# Patient Record
Sex: FEMALE | Race: WHITE | NOT HISPANIC OR LATINO | Employment: OTHER | URBAN - METROPOLITAN AREA
[De-identification: names, ages, dates, MRNs, and addresses within clinical notes are randomized per-mention and may not be internally consistent; named-entity substitution may affect disease eponyms.]

---

## 2017-04-03 RX ORDER — LEVOTHYROXINE SODIUM 0.07 MG/1
TABLET ORAL
Qty: 90 TAB | Refills: 1 | Status: SHIPPED | OUTPATIENT
Start: 2017-04-03 | End: 2017-09-04 | Stop reason: SDUPTHER

## 2017-04-03 NOTE — TELEPHONE ENCOUNTER
Was the patient seen in the last year in this department? Yes     Does patient have an active prescription for medications requested? No     Received Request Via: Pharmacy     Last seen: 07/18/2016 Tadeo

## 2017-04-05 ENCOUNTER — OFFICE VISIT (OUTPATIENT)
Dept: CARDIOLOGY | Facility: MEDICAL CENTER | Age: 57
End: 2017-04-05
Payer: COMMERCIAL

## 2017-04-05 VITALS
BODY MASS INDEX: 21.97 KG/M2 | HEIGHT: 67 IN | DIASTOLIC BLOOD PRESSURE: 70 MMHG | WEIGHT: 140 LBS | HEART RATE: 70 BPM | SYSTOLIC BLOOD PRESSURE: 116 MMHG | OXYGEN SATURATION: 93 %

## 2017-04-05 DIAGNOSIS — I95.1 ORTHOSTATIC HYPOTENSION: ICD-10-CM

## 2017-04-05 DIAGNOSIS — R55 SYNCOPE AND COLLAPSE: ICD-10-CM

## 2017-04-05 DIAGNOSIS — R55 VASOVAGAL SYNCOPE: ICD-10-CM

## 2017-04-05 LAB — EKG IMPRESSION: NORMAL

## 2017-04-05 PROCEDURE — 99213 OFFICE O/P EST LOW 20 MIN: CPT | Performed by: INTERNAL MEDICINE

## 2017-04-05 PROCEDURE — 93000 ELECTROCARDIOGRAM COMPLETE: CPT | Performed by: INTERNAL MEDICINE

## 2017-04-05 NOTE — PROGRESS NOTES
Subjective:   Kalie Cook is a 56 y.o. female who presents today with neurocardiogenic syncope with good response to salt, hydration and midodrine. No further episodes.    Past Medical History   Diagnosis Date   • Asthma      Past Surgical History   Procedure Laterality Date   • Sinusotomies     • Primary c section     • Abdominal exploration       History reviewed. No pertinent family history.  History   Smoking status   • Never Smoker    Smokeless tobacco   • Never Used     Allergies   Allergen Reactions   • Sulfa Drugs Hives     Outpatient Encounter Prescriptions as of 4/5/2017   Medication Sig Dispense Refill   • levothyroxine (SYNTHROID) 75 MCG Tab TAKE 1 TABLET EVERY MORNING ON AN EMPTY STOMACH 90 Tab 1   • escitalopram (LEXAPRO) 20 MG tablet Take 1 Tab by mouth every day. 90 Tab 3   • midodrine (PROAMATINE) 10 MG tablet Take 1 Tab by mouth 3 times a day, with meals. 270 Tab 3   • Nutritional Supplements (ESTROVEN PO) Take  by mouth.     • Mometasone Furo-Formoterol Fum 100-5 MCG/ACT Aerosol Inhale  by mouth.     • budesonide (PULMICORT) 0.5 MG/2ML Suspension 500 mcg every day. Patient uses nebulizer solution in a mary jane pot like applicator with saline solution and performs a nasal irrigation twice a day     • montelukast (SINGULAIR) 10 MG Tab Take 10 mg by mouth every day.     • aspirin (ASA) 325 MG Tab Take 325 mg by mouth 2 Times a Day.     • Multiple Vitamin (MULTI VITAMIN DAILY PO) Take 1 tablet by mouth every day.     • Probiotic Product (PROBIOTIC DAILY PO) Take 1 Tab by mouth every day.     • TURMERIC PO Take 1 Tab by mouth every day.     • COD LIVER OIL PO Take 2 Caps by mouth every day.     • Multiple Vitamins-Minerals (AIRBORNE) Chew Tab Take 2 Tabs by mouth 2 Times a Day.     • escitalopram (LEXAPRO) 20 MG tablet TAKE 1 TABLET EVERY DAY (Patient not taking: Reported on 4/5/2017) 90 Tab 1   • Oral Electrolytes (BUFFERED SALT) 482 MG Tab Take 1 Tab by mouth 2 Times a Day.       No  "facility-administered encounter medications on file as of 4/5/2017.     ROS     Objective:   /70 mmHg  Pulse 70  Ht 1.702 m (5' 7.01\")  Wt 63.504 kg (140 lb)  BMI 21.92 kg/m2  SpO2 93%    Physical Exam   Constitutional: She is oriented to person, place, and time. She appears well-developed and well-nourished. No distress.   HENT:   Mouth/Throat: Oropharynx is clear and moist.   Eyes: Conjunctivae and EOM are normal.   Neck: Neck supple. No JVD present. No thyroid mass present.   Cardiovascular: Normal rate, regular rhythm, S1 normal, S2 normal and normal pulses.  PMI is not displaced.  Exam reveals no gallop.    No murmur heard.  Pulses:       Carotid pulses are 2+ on the right side, and 2+ on the left side.       Radial pulses are 2+ on the right side, and 2+ on the left side.        Femoral pulses are 2+ on the right side, and 2+ on the left side.       Dorsalis pedis pulses are 2+ on the right side, and 2+ on the left side.   No peripheral edema.   Pulmonary/Chest: Effort normal and breath sounds normal.   Abdominal: Soft. Normal appearance. She exhibits no abdominal bruit and no mass. There is no hepatosplenomegaly. There is no tenderness.   Musculoskeletal: Normal range of motion. She exhibits no edema.        Lumbar back: She exhibits no tenderness and no spasm.   Neurological: She is alert and oriented to person, place, and time. She has normal strength.   Skin: Skin is warm and dry. No rash noted. No cyanosis. Nails show no clubbing.   Psychiatric: She has a normal mood and affect.       Assessment:     1. Syncope and collapse  EKG   2. Vasovagal syncope     3. Orthostatic hypotension         Medical Decision Making:  Today's Assessment / Status / Plan:     1. Syncope continue current regimen. She may try cutting midodrine back this summer.  2. F/U in 6 months.  "

## 2017-04-05 NOTE — MR AVS SNAPSHOT
"        Kalie Cook   2017 4:20 PM   Office Visit   MRN: [id number]    Department:  [facility]   Dept Phone:  [phone]    Description:  Female : 1960   Provider:  Joselito Peres M.D.           Reason for Visit     Follow-Up           Allergies as of 2017     Allergen Noted Reactions    Sulfa Drugs 2016   Hives      You were diagnosed with     Syncope and collapse   [780.2.ICD-9-CM]       Vasovagal syncope   [2015]       Orthostatic hypotension   [458.0.ICD-9-CM]         Vital Signs     Blood Pressure Pulse Height Weight Body Mass Index Oxygen Saturation    116/70 mmHg 70 1.702 m (5' 7.01\") 63.504 kg (140 lb) 21.92 kg/m2 93%    Smoking Status                   Never Smoker            Basic Information     Date Of Birth Sex Race Ethnicity Preferred Language    1960 Female White Non- English      Your appointments     Oct 04, 2017  4:20 PM   FOLLOW UP with Joselito Peres M.D.   [facility] (--)    [address]  [facility] [address]   [phone]              Problem List              ICD-10-CM Priority Class Noted - Resolved    Syncope and collapse R55 High  3/31/2016 - Present    Sinusitis J32.9   3/31/2016 - Present    Hypothyroidism (acquired) E03.9   2016 - Present    Depression F32.9   2016 - Present    Vasovagal syncope R55   2016 - Present    Orthostatic hypotension I95.1   2016 - Present    Pap smear of cervix shows high risk HPV present R87.810   2016 - Present      Health Maintenance        Date Due Completion Dates    IMM DTaP/Tdap/Td Vaccine (1 - Tdap) 12/15/1978 ---    COLONOSCOPY 12/15/2009 ---    MAMMOGRAM 2017, 7/15/2016, 7/15/2016, 2015 (Done)    Override on 2015: Done    PAP SMEAR 2019, 2015 (Done)    Override on 2015: Done            Results       Current Immunizations     No immunizations on file.      Below and/or attached are " the medications your provider expects you to take. Review all of your home medications and newly ordered medications with your provider and/or pharmacist. Follow medication instructions as directed by your provider and/or pharmacist. Please keep your medication list with you and share with your provider. Update the information when medications are discontinued, doses are changed, or new medications (including over-the-counter products) are added; and carry medication information at all times in the event of emergency situations     Allergies:  SULFA DRUGS - Hives               Medications  Valid as of: April 05, 2017 -  4:53 PM    Generic Name Brand Name Tablet Size Instructions for use    Aspirin (Tab)  MG Take 325 mg by mouth 2 Times a Day.        Budesonide (Suspension) PULMICORT 0.5 MG/2ML 500 mcg every day. Patient uses nebulizer solution in a mary jane pot like applicator with saline solution and performs a nasal irrigation twice a day        Cod Liver Oil   Take 2 Caps by mouth every day.        Escitalopram Oxalate (Tab) LEXAPRO 20 MG Take 1 Tab by mouth every day.        Escitalopram Oxalate (Tab) LEXAPRO 20 MG TAKE 1 TABLET EVERY DAY        Levothyroxine Sodium (Tab) SYNTHROID 75 MCG TAKE 1 TABLET EVERY MORNING ON AN EMPTY STOMACH        Midodrine HCl (Tab) PROAMATINE 10 MG Take 1 Tab by mouth 3 times a day, with meals.        Mometasone Furo-Formoterol Fum (Aerosol) Mometasone Furo-Formoterol Fum 100-5 MCG/ACT Inhale  by mouth.        Montelukast Sodium (Tab) SINGULAIR 10 MG Take 10 mg by mouth every day.        Multiple Vitamin   Take 1 tablet by mouth every day.        Multiple Vitamins-Minerals (Chew Tab) AIRBORNE  Take 2 Tabs by mouth 2 Times a Day.        Nutritional Supplements   Take  by mouth.        Oral Electrolytes (Tab) buffered salt 482 MG Take 1 Tab by mouth 2 Times a Day.        Probiotic Product   Take 1 Tab by mouth every day.        Turmeric   Take 1 Tab by mouth every day.        .                  Medicines prescribed today were sent to:     [facility] [place], [place] - [address] [facility]    [address] [facility] [address]    Phone: [phone] Fax: [phone]    Open 24 Hours?: No      Medication refill instructions:       If your prescription bottle indicates you have medication refills left, it is not necessary to call your provider’s office. Please contact your pharmacy and they will refill your medication.    If your prescription bottle indicates you do not have any refills left, you may request refills at any time through one of the following ways: The online [employer] system (except Urgent Care), by calling your provider’s office, or by asking your pharmacy to contact your provider’s office with a refill request. Medication refills are processed only during regular business hours and may not be available until the next business day. Your provider may request additional information or to have a follow-up visit with you prior to refilling your medication.   *Please Note: Medication refills are assigned a new Rx number when refilled electronically. Your pharmacy may indicate that no refills were authorized even though a new prescription for the same medication is available at the pharmacy. Please request the medicine by name with the pharmacy before contacting your provider for a refill.           [employer] Access Code: Activation code not generated  Current [employer] Status: Active

## 2017-04-05 NOTE — Clinical Note
[facility]   [address], [address]  [place] [address]  Phone: [phone]  Fax: [phone]              Kalie Cook  1960    Encounter Date: 4/5/2017    Joselito Peres M.D.          PROGRESS NOTE:  Subjective:   Kalie Cook is a 56 y.o. female who presents today with neurocardiogenic syncope with good response to salt, hydration and midodrine. No further episodes.    Past Medical History   Diagnosis Date   • Asthma      Past Surgical History   Procedure Laterality Date   • Sinusotomies     • Primary c section     • Abdominal exploration       History reviewed. No pertinent family history.  History   Smoking status   • Never Smoker    Smokeless tobacco   • Never Used     Allergies   Allergen Reactions   • Sulfa Drugs Hives     Outpatient Encounter Prescriptions as of 4/5/2017   Medication Sig Dispense Refill   • levothyroxine (SYNTHROID) 75 MCG Tab TAKE 1 TABLET EVERY MORNING ON AN EMPTY STOMACH 90 Tab 1   • escitalopram (LEXAPRO) 20 MG tablet Take 1 Tab by mouth every day. 90 Tab 3   • midodrine (PROAMATINE) 10 MG tablet Take 1 Tab by mouth 3 times a day, with meals. 270 Tab 3   • Nutritional Supplements (ESTROVEN PO) Take  by mouth.     • Mometasone Furo-Formoterol Fum 100-5 MCG/ACT Aerosol Inhale  by mouth.     • budesonide (PULMICORT) 0.5 MG/2ML Suspension 500 mcg every day. Patient uses nebulizer solution in a mary jane pot like applicator with saline solution and performs a nasal irrigation twice a day     • montelukast (SINGULAIR) 10 MG Tab Take 10 mg by mouth every day.     • aspirin (ASA) 325 MG Tab Take 325 mg by mouth 2 Times a Day.     • Multiple Vitamin (MULTI VITAMIN DAILY PO) Take 1 tablet by mouth every day.     • Probiotic Product (PROBIOTIC DAILY PO) Take 1 Tab by mouth every day.     • TURMERIC PO Take 1 Tab by mouth every day.     • COD LIVER OIL PO Take 2 Caps by mouth every day.     • Multiple Vitamins-Minerals (AIRBORNE) Chew Tab Take 2  "Tabs by mouth 2 Times a Day.     • escitalopram (LEXAPRO) 20 MG tablet TAKE 1 TABLET EVERY DAY (Patient not taking: Reported on 4/5/2017) 90 Tab 1   • Oral Electrolytes (BUFFERED SALT) 482 MG Tab Take 1 Tab by mouth 2 Times a Day.       No facility-administered encounter medications on file as of 4/5/2017.     ROS     Objective:   /70 mmHg  Pulse 70  Ht 1.702 m (5' 7.01\")  Wt 63.504 kg (140 lb)  BMI 21.92 kg/m2  SpO2 93%    Physical Exam   Constitutional: She is oriented to person, place, and time. She appears well-developed and well-nourished. No distress.   HENT:   Mouth/Throat: Oropharynx is clear and moist.   Eyes: Conjunctivae and EOM are normal.   Neck: Neck supple. No JVD present. No thyroid mass present.   Cardiovascular: Normal rate, regular rhythm, S1 normal, S2 normal and normal pulses.  PMI is not displaced.  Exam reveals no gallop.    No murmur heard.  Pulses:       Carotid pulses are 2+ on the right side, and 2+ on the left side.       Radial pulses are 2+ on the right side, and 2+ on the left side.        Femoral pulses are 2+ on the right side, and 2+ on the left side.       Dorsalis pedis pulses are 2+ on the right side, and 2+ on the left side.   No peripheral edema.   Pulmonary/Chest: Effort normal and breath sounds normal.   Abdominal: Soft. Normal appearance. She exhibits no abdominal bruit and no mass. There is no hepatosplenomegaly. There is no tenderness.   Musculoskeletal: Normal range of motion. She exhibits no edema.        Lumbar back: She exhibits no tenderness and no spasm.   Neurological: She is alert and oriented to person, place, and time. She has normal strength.   Skin: Skin is warm and dry. No rash noted. No cyanosis. Nails show no clubbing.   Psychiatric: She has a normal mood and affect.       Assessment:     1. Syncope and collapse  EKG   2. Vasovagal syncope     3. Orthostatic hypotension         Medical Decision Making:  Today's Assessment / Status / Plan:     1. " Syncope continue current regimen. She may try cutting midodrine back this summer.  2. F/U in 6 months.      CIERRA Purvis.R.N.  [address] [facility]  [address]  [place] [address]  VIA In Basket

## 2017-04-11 ENCOUNTER — TELEPHONE (OUTPATIENT)
Dept: CARDIOLOGY | Facility: MEDICAL CENTER | Age: 57
End: 2017-04-11

## 2017-04-11 NOTE — TELEPHONE ENCOUNTER
Please advise if there are any concerns with this. Shaun email her back      Kalie Cook    To   Joselito Peres M.D.    Sent   4/11/2017  6:10 AM         Hi Dr. Peres,   My EKG tests results indicate I have a T-wave abnormality.  Please explain to me what that is.   Thank you,   Kalie Cook

## 2017-06-13 DIAGNOSIS — R55 VASOVAGAL SYNCOPE: ICD-10-CM

## 2017-06-13 RX ORDER — MIDODRINE HYDROCHLORIDE 10 MG/1
TABLET ORAL
Qty: 270 TAB | Refills: 2 | Status: SHIPPED | OUTPATIENT
Start: 2017-06-13 | End: 2017-10-16 | Stop reason: SDUPTHER

## 2017-08-22 ENCOUNTER — HOSPITAL ENCOUNTER (OUTPATIENT)
Dept: RADIOLOGY | Facility: MEDICAL CENTER | Age: 57
End: 2017-08-22
Attending: NURSE PRACTITIONER
Payer: COMMERCIAL

## 2017-08-22 DIAGNOSIS — Z12.31 ENCOUNTER FOR SCREENING MAMMOGRAM FOR BREAST CANCER: ICD-10-CM

## 2017-08-22 PROCEDURE — 77063 BREAST TOMOSYNTHESIS BI: CPT

## 2017-09-05 RX ORDER — LEVOTHYROXINE SODIUM 0.07 MG/1
TABLET ORAL
Qty: 90 TAB | Refills: 0 | Status: SHIPPED | OUTPATIENT
Start: 2017-09-05 | End: 2018-03-08

## 2017-09-26 ENCOUNTER — HOSPITAL ENCOUNTER (OUTPATIENT)
Facility: MEDICAL CENTER | Age: 57
End: 2017-09-26
Payer: COMMERCIAL

## 2017-09-27 LAB
ALBUMIN SERPL BCP-MCNC: 4.4 G/DL (ref 3.2–4.9)
ALBUMIN/GLOB SERPL: 1.5 G/DL
ALP SERPL-CCNC: 65 U/L (ref 30–99)
ALT SERPL-CCNC: 14 U/L (ref 2–50)
ANION GAP SERPL CALC-SCNC: 3 MMOL/L (ref 0–11.9)
AST SERPL-CCNC: 22 U/L (ref 12–45)
BDY FAT % MEASURED: 31.8 %
BILIRUB SERPL-MCNC: 0.4 MG/DL (ref 0.1–1.5)
BP DIAS: 80 MMHG
BP SYS: 120 MMHG
BUN SERPL-MCNC: 24 MG/DL (ref 8–22)
CALCIUM SERPL-MCNC: 9.9 MG/DL (ref 8.5–10.5)
CHLORIDE SERPL-SCNC: 101 MMOL/L (ref 96–112)
CHOLEST SERPL-MCNC: 231 MG/DL (ref 100–199)
CO2 SERPL-SCNC: 33 MMOL/L (ref 20–33)
CREAT SERPL-MCNC: 0.87 MG/DL (ref 0.5–1.4)
DIABETES HTDIA: NO
EVENT NAME HTEVT: NORMAL
GFR SERPL CREATININE-BSD FRML MDRD: >60 ML/MIN/1.73 M 2
GLOBULIN SER CALC-MCNC: 3 G/DL (ref 1.9–3.5)
GLUCOSE SERPL-MCNC: 77 MG/DL (ref 65–99)
HDLC SERPL-MCNC: 84 MG/DL
HYPERTENSION HTHYP: NO
LDLC SERPL CALC-MCNC: 136 MG/DL
POTASSIUM SERPL-SCNC: 4.7 MMOL/L (ref 3.6–5.5)
PROT SERPL-MCNC: 7.4 G/DL (ref 6–8.2)
SCREENING LOC CITY HTCIT: NORMAL
SCREENING LOC STATE HTSTA: NORMAL
SCREENING LOCATION HTLOC: NORMAL
SODIUM SERPL-SCNC: 137 MMOL/L (ref 135–145)
SUBSCRIBER ID HTSID: NORMAL
TRIGL SERPL-MCNC: 54 MG/DL (ref 0–149)

## 2017-10-02 ENCOUNTER — TELEPHONE (OUTPATIENT)
Dept: MEDICAL GROUP | Facility: MEDICAL CENTER | Age: 57
End: 2017-10-02

## 2017-10-02 NOTE — LETTER
October 6, 2017        Kalie Cook  [address]  [place] [address]        Our office has tried to contact you multiple times. Umm Piedra would like to inform you of your results.  Your biometric screening showed normal blood pressure, kidney function, liver function, and electrolytes. Cholesterol panel was abnormal. Your total cholesterol increased from last year from 217 to 231 and her LDL (bad cholesterol) went from 127 to136. Your HDL (good cholesterol) and triglycerides were normal. Your BUN, which his kidney function related, was a little elevated however this could be related to her fasting morning and you may have been a little dehydrated. We encourage you drink more water.                  Thank you,            Cadence Michael

## 2017-10-02 NOTE — TELEPHONE ENCOUNTER
----- Message from GISELL Sheriff sent at 9/29/2017  5:40 PM PDT -----  Please note the patient that her biometric screening showed normal blood pressure, kidney function, liver function, and electrolytes. Her cholesterol panel was abnormal. Her total cholesterol increased from last year from 217 to 231 and her LDL went from 127-136. Her HDL and triglycerides were normal. Her BUN, which his kidney function related, was a little elevated however this could be related to her fasting morning and she may have been a little dehydrated. Encourage her to drink more water.    GISELL Sheriff

## 2017-10-09 ENCOUNTER — TELEPHONE (OUTPATIENT)
Dept: MEDICAL GROUP | Facility: MEDICAL CENTER | Age: 57
End: 2017-10-09

## 2017-10-09 NOTE — TELEPHONE ENCOUNTER
----- Message from Kalie Cook sent at 10/8/2017  9:30 AM PDT -----  Regarding: RE:Results  Contact: [phone]  Aram Vila,  [place] have [place].  So sorry I missed your calls.  Thank you for sending me this email.  I will develop a plan to lower my cholesterol.  Thank you again,  Kalie Cook  ----- Message -----  From: Del Ho Ass't  Sent: 10/4/2017  4:43 PM PDT  To: Kalie Cook  Subject: Results  Hello, I have tried to contact you .  Here are your lab results.    Your biometric screening showed normal blood pressure, kidney function, liver function, and electrolytes. You cholesterol panel was abnormal. Your total cholesterol increased from last year from 217 to 231 and your LDL went from 127-136. Your HDL and triglycerides were normal. Your BUN, which his kidney function related, was a little elevated however this could be related to your fasting morning you may have been a little dehydrated. We encourage you drink more water.

## 2017-10-16 ENCOUNTER — OFFICE VISIT (OUTPATIENT)
Dept: CARDIOLOGY | Facility: MEDICAL CENTER | Age: 57
End: 2017-10-16
Payer: COMMERCIAL

## 2017-10-16 VITALS
BODY MASS INDEX: 22.6 KG/M2 | SYSTOLIC BLOOD PRESSURE: 118 MMHG | HEART RATE: 63 BPM | DIASTOLIC BLOOD PRESSURE: 74 MMHG | OXYGEN SATURATION: 94 % | HEIGHT: 67 IN | WEIGHT: 144 LBS

## 2017-10-16 DIAGNOSIS — R55 SYNCOPE AND COLLAPSE: ICD-10-CM

## 2017-10-16 DIAGNOSIS — R55 VASOVAGAL SYNCOPE: ICD-10-CM

## 2017-10-16 LAB — EKG IMPRESSION: NORMAL

## 2017-10-16 PROCEDURE — 93000 ELECTROCARDIOGRAM COMPLETE: CPT | Performed by: INTERNAL MEDICINE

## 2017-10-16 PROCEDURE — 99214 OFFICE O/P EST MOD 30 MIN: CPT | Performed by: INTERNAL MEDICINE

## 2017-10-16 RX ORDER — MIDODRINE HYDROCHLORIDE 10 MG/1
TABLET ORAL
Qty: 270 TAB | Refills: 3 | Status: SHIPPED | OUTPATIENT
Start: 2017-10-16 | End: 2018-12-10 | Stop reason: SDUPTHER

## 2017-10-16 ASSESSMENT — ENCOUNTER SYMPTOMS
BLURRED VISION: 0
SPEECH CHANGE: 0
WHEEZING: 0
DEPRESSION: 0
ABDOMINAL PAIN: 0
EYE PAIN: 0
BRUISES/BLEEDS EASILY: 0
MYALGIAS: 0
CHILLS: 0
NERVOUS/ANXIOUS: 0
LOSS OF CONSCIOUSNESS: 0
VOMITING: 0
NAUSEA: 0
COUGH: 0
HEMOPTYSIS: 0
PALPITATIONS: 0
FEVER: 0
EYE DISCHARGE: 0

## 2017-10-16 NOTE — LETTER
[facility]   [address]  [place] [address]  Phone: [phone]  Fax: [phone]              Kalie Cook  1960    Encounter Date: 10/16/2017    Joselito Peres M.D.          PROGRESS NOTE:  Subjective:   Kalie Cook is a 56 y.o. female who presents today with vasodepressor syncope. Taking midodrine q 4 to 5 hours. She has some dizziness in am at 5 am but does not take the midodrine until 7 am. Throughout the day does well.    Past Medical History:   Diagnosis Date   • Asthma      Past Surgical History:   Procedure Laterality Date   • ABDOMINAL EXPLORATION     • PRIMARY C SECTION     • SINUSOTOMIES       History reviewed. No pertinent family history.  History   Smoking Status   • Never Smoker   Smokeless Tobacco   • Never Used     Allergies   Allergen Reactions   • Sulfa Drugs Hives     Outpatient Encounter Prescriptions as of 10/16/2017   Medication Sig Dispense Refill   • midodrine (PROAMATINE) 10 MG tablet Every 4 to 5 hours while awake, up to three tabs per day 270 Tab 3   • levothyroxine (SYNTHROID) 75 MCG Tab TAKE 1 TABLET EVERY MORNING ON AN EMPTY STOMACH 90 Tab 0   • escitalopram (LEXAPRO) 20 MG tablet TAKE 1 TABLET EVERY DAY 90 Tab 1   • escitalopram (LEXAPRO) 20 MG tablet Take 1 Tab by mouth every day. 90 Tab 3   • Nutritional Supplements (ESTROVEN PO) Take  by mouth.     • Oral Electrolytes (BUFFERED SALT) 482 MG Tab Take 1 Tab by mouth 2 Times a Day.     • Mometasone Furo-Formoterol Fum 100-5 MCG/ACT Aerosol Inhale  by mouth.     • budesonide (PULMICORT) 0.5 MG/2ML Suspension 500 mcg every day. Patient uses nebulizer solution in a mary jane pot like applicator with saline solution and performs a nasal irrigation twice a day     • montelukast (SINGULAIR) 10 MG Tab Take 10 mg by mouth every day.     • aspirin (ASA) 325 MG Tab Take 325 mg by mouth 2 Times a Day.     • Multiple Vitamin (MULTI VITAMIN DAILY PO) Take 1 tablet by mouth every  "day.     • Probiotic Product (PROBIOTIC DAILY PO) Take 1 Tab by mouth every day.     • TURMERIC PO Take 1 Tab by mouth every day.     • COD LIVER OIL PO Take 2 Caps by mouth every day.     • Multiple Vitamins-Minerals (AIRBORNE) Chew Tab Take 2 Tabs by mouth 2 Times a Day.     • [DISCONTINUED] midodrine (PROAMATINE) 10 MG tablet TAKE 1 TABLET 3 TIMES A DAY WITH MEALS 270 Tab 2     No facility-administered encounter medications on file as of 10/16/2017.      Review of Systems   Constitutional: Negative for chills and fever.   HENT: Negative for congestion.    Eyes: Negative for blurred vision, pain and discharge.   Respiratory: Negative for cough, hemoptysis and wheezing.    Cardiovascular: Negative for chest pain and palpitations.   Gastrointestinal: Negative for abdominal pain, nausea and vomiting.   Musculoskeletal: Negative for joint pain and myalgias.   Skin: Negative for itching and rash.   Neurological: Negative for speech change and loss of consciousness.   Endo/Heme/Allergies: Does not bruise/bleed easily.   Psychiatric/Behavioral: Negative for depression. The patient is not nervous/anxious.    All other systems reviewed and are negative.       Objective:   /74   Pulse 63   Ht 1.702 m (5' 7.01\")   Wt 65.3 kg (144 lb)   SpO2 94%   BMI 22.55 kg/m²      Physical Exam    Assessment:     1. Syncope and collapse  EKG   2. Vasovagal syncope  midodrine (PROAMATINE) 10 MG tablet       Medical Decision Making:  Today's Assessment / Status / Plan:   1. Vasovagal symptoms continue midodrine but shift dosing to when awakens.  2. F/U with me in 6 months.      Umm Piedra, A.P.R.N.  [address]  [address]  [facility] [address]  VIA In Basket                 "

## 2017-10-16 NOTE — PROGRESS NOTES
Subjective:   Kalie Cook is a 56 y.o. female who presents today with vasodepressor syncope. Taking midodrine q 4 to 5 hours. She has some dizziness in am at 5 am but does not take the midodrine until 7 am. Throughout the day does well.    Past Medical History:   Diagnosis Date   • Asthma      Past Surgical History:   Procedure Laterality Date   • ABDOMINAL EXPLORATION     • PRIMARY C SECTION     • SINUSOTOMIES       History reviewed. No pertinent family history.  History   Smoking Status   • Never Smoker   Smokeless Tobacco   • Never Used     Allergies   Allergen Reactions   • Sulfa Drugs Hives     Outpatient Encounter Prescriptions as of 10/16/2017   Medication Sig Dispense Refill   • midodrine (PROAMATINE) 10 MG tablet Every 4 to 5 hours while awake, up to three tabs per day 270 Tab 3   • levothyroxine (SYNTHROID) 75 MCG Tab TAKE 1 TABLET EVERY MORNING ON AN EMPTY STOMACH 90 Tab 0   • escitalopram (LEXAPRO) 20 MG tablet TAKE 1 TABLET EVERY DAY 90 Tab 1   • escitalopram (LEXAPRO) 20 MG tablet Take 1 Tab by mouth every day. 90 Tab 3   • Nutritional Supplements (ESTROVEN PO) Take  by mouth.     • Oral Electrolytes (BUFFERED SALT) 482 MG Tab Take 1 Tab by mouth 2 Times a Day.     • Mometasone Furo-Formoterol Fum 100-5 MCG/ACT Aerosol Inhale  by mouth.     • budesonide (PULMICORT) 0.5 MG/2ML Suspension 500 mcg every day. Patient uses nebulizer solution in a mary jane pot like applicator with saline solution and performs a nasal irrigation twice a day     • montelukast (SINGULAIR) 10 MG Tab Take 10 mg by mouth every day.     • aspirin (ASA) 325 MG Tab Take 325 mg by mouth 2 Times a Day.     • Multiple Vitamin (MULTI VITAMIN DAILY PO) Take 1 tablet by mouth every day.     • Probiotic Product (PROBIOTIC DAILY PO) Take 1 Tab by mouth every day.     • TURMERIC PO Take 1 Tab by mouth every day.     • COD LIVER OIL PO Take 2 Caps by mouth every day.     • Multiple Vitamins-Minerals (AIRBORNE) Chew Tab Take 2 Tabs by mouth 2  "Times a Day.     • [DISCONTINUED] midodrine (PROAMATINE) 10 MG tablet TAKE 1 TABLET 3 TIMES A DAY WITH MEALS 270 Tab 2     No facility-administered encounter medications on file as of 10/16/2017.      Review of Systems   Constitutional: Negative for chills and fever.   HENT: Negative for congestion.    Eyes: Negative for blurred vision, pain and discharge.   Respiratory: Negative for cough, hemoptysis and wheezing.    Cardiovascular: Negative for chest pain and palpitations.   Gastrointestinal: Negative for abdominal pain, nausea and vomiting.   Musculoskeletal: Negative for joint pain and myalgias.   Skin: Negative for itching and rash.   Neurological: Negative for speech change and loss of consciousness.   Endo/Heme/Allergies: Does not bruise/bleed easily.   Psychiatric/Behavioral: Negative for depression. The patient is not nervous/anxious.    All other systems reviewed and are negative.       Objective:   /74   Pulse 63   Ht 1.702 m (5' 7.01\")   Wt 65.3 kg (144 lb)   SpO2 94%   BMI 22.55 kg/m²     Physical Exam    Assessment:     1. Syncope and collapse  EKG   2. Vasovagal syncope  midodrine (PROAMATINE) 10 MG tablet       Medical Decision Making:  Today's Assessment / Status / Plan:   1. Vasovagal symptoms continue midodrine but shift dosing to when awakens.  2. F/U with me in 6 months.  "

## 2018-01-04 ENCOUNTER — OFFICE VISIT (OUTPATIENT)
Dept: MEDICAL GROUP | Facility: MEDICAL CENTER | Age: 58
End: 2018-01-04
Payer: COMMERCIAL

## 2018-01-04 VITALS
SYSTOLIC BLOOD PRESSURE: 124 MMHG | HEART RATE: 52 BPM | HEIGHT: 67 IN | OXYGEN SATURATION: 97 % | BODY MASS INDEX: 22.84 KG/M2 | WEIGHT: 145.5 LBS | RESPIRATION RATE: 16 BRPM | DIASTOLIC BLOOD PRESSURE: 76 MMHG | TEMPERATURE: 97.7 F

## 2018-01-04 DIAGNOSIS — I95.1 ORTHOSTATIC HYPOTENSION: ICD-10-CM

## 2018-01-04 DIAGNOSIS — E03.9 HYPOTHYROIDISM (ACQUIRED): ICD-10-CM

## 2018-01-04 DIAGNOSIS — Z76.89 ESTABLISHING CARE WITH NEW DOCTOR, ENCOUNTER FOR: ICD-10-CM

## 2018-01-04 DIAGNOSIS — R87.810 PAP SMEAR OF CERVIX SHOWS HIGH RISK HPV PRESENT: ICD-10-CM

## 2018-01-04 DIAGNOSIS — F32.4 MAJOR DEPRESSIVE DISORDER WITH SINGLE EPISODE, IN PARTIAL REMISSION (HCC): ICD-10-CM

## 2018-01-04 DIAGNOSIS — J45.30 MILD PERSISTENT ASTHMA WITHOUT COMPLICATION: ICD-10-CM

## 2018-01-04 PROCEDURE — 99214 OFFICE O/P EST MOD 30 MIN: CPT | Performed by: INTERNAL MEDICINE

## 2018-01-04 ASSESSMENT — PATIENT HEALTH QUESTIONNAIRE - PHQ9: CLINICAL INTERPRETATION OF PHQ2 SCORE: 0

## 2018-01-04 NOTE — PROGRESS NOTES
Chief Complaint   Patient presents with   • Establish Care     transfer from [facility]   • Medication Management     discuss current meds       HISTORY OF PRESENT ILLNESS: Patient is a 57 y.o. female patient who presents today to discuss the evaluation and management of:    Chief complaint: Orthostatic hypotension.      1. Establishing care with new doctor, encounter for    Patient is transferring from [facility], last seen July 2016. In general patient is a healthy woman, she is followed by pulmonary and ENT for asthma and nasal polyps. She is a second , her  is also my patient.     2. Pap smear of cervix shows high risk HPV present    Patient had high-risk serotype noted on HPV screening at her last Pap smear 1-1/2 years ago. She is due for a repeat.    3. Orthostatic hypotension    Patient is followed by Dr. Peres of cardiology for vasovagal syncope. It was determined that midodrine about every 4 hours is best at keeping her blood pressure up and keeping her from having syncopal episodes.    4. Major depressive disorder with single episode, in partial remission (CMS-[facility])    Patient has very mild depression and anxiety. She takes Lexapro 20 mg daily for this with good results.    5. Mild persistent asthma without complication    Patient has a steroid inhaler which she uses regularly. She has had no recent exacerbations or pulmonary infections.    6. Hypothyroidism (acquired)    Patient was diagnosed with mild hypothyroidism with a TSH of 7.8 at the time of her syncopal workup. She currently is taking Synthroid 75 µg daily.        Patient Active Problem List    Diagnosis Date Noted   • Mild persistent asthma without complication 01/04/2018   • Pap smear of cervix shows high risk HPV present 07/20/2016   • Orthostatic hypotension 05/19/2016   • Hypothyroidism (acquired) 04/25/2016   • Depression 04/25/2016        Allergies:Sulfa drugs    Current meds including changes today  Current  "Outpatient Prescriptions   Medication Sig Dispense Refill   • midodrine (PROAMATINE) 10 MG tablet Every 4 to 5 hours while awake, up to three tabs per day 270 Tab 3   • levothyroxine (SYNTHROID) 75 MCG Tab TAKE 1 TABLET EVERY MORNING ON AN EMPTY STOMACH 90 Tab 0   • escitalopram (LEXAPRO) 20 MG tablet Take 1 Tab by mouth every day. 90 Tab 3   • Mometasone Furo-Formoterol Fum 100-5 MCG/ACT Aerosol Inhale  by mouth.     • budesonide (PULMICORT) 0.5 MG/2ML Suspension 500 mcg every day. Patient uses nebulizer solution in a mary jane pot like applicator with saline solution and performs a nasal irrigation twice a day     • montelukast (SINGULAIR) 10 MG Tab Take 10 mg by mouth every day.     • aspirin (ASA) 325 MG Tab Take 325 mg by mouth 2 Times a Day.     • Nutritional Supplements (ESTROVEN PO) Take  by mouth.     • Multiple Vitamin (MULTI VITAMIN DAILY PO) Take 1 tablet by mouth every day.     • Probiotic Product (PROBIOTIC DAILY PO) Take 1 Tab by mouth every day.     • TURMERIC PO Take 1 Tab by mouth every day.     • COD LIVER OIL PO Take 2 Caps by mouth every day.     • Multiple Vitamins-Minerals (AIRBORNE) Chew Tab Take 2 Tabs by mouth 2 Times a Day.       No current facility-administered medications for this visit.      Social History   Substance Use Topics   • Smoking status: Never Smoker   • Smokeless tobacco: Never Used   • Alcohol use 4.2 oz/week     7 Standard drinks or equivalent per week      Comment: 1 a night     Social History     Social History Narrative   • No narrative on file       Family History   Problem Relation Age of Onset   • Heart Disease Father        Review of Systems:  No chest pain, No shortness of breath, No dyspnea on exertion  Gastrointestinal ROS: No abdominal pain, No nausea, vomiting, diarrhea, or constipation        Exam:      Blood pressure 124/76, pulse (!) 52, temperature 36.5 °C (97.7 °F), resp. rate 16, height 1.702 m (5' 7.01\"), weight 66 kg (145 lb 8.1 oz), SpO2 97 %, not " currently breastfeeding.  General:  Well nourished, well developed female in NAD affect and mood within normal limits  Head is grossly normal.  Neck: Supple without adenopathy  Pulmonary: Clear to ausculation.  Normal effort. No rales, rhonchi, or wheezing.  Cardiovascular: Regular rate and rhythm without murmur.   Extremities: no clubbing, cyanosis, or edema.  Neuro: moves all extremities symmetrically    Please note that this dictation was created using voice recognition software. I have made every reasonable attempt to correct obvious errors, but I expect that there are errors of grammar and possibly content that I did not discover before finalizing the note.    Assessment/Plan:  1. Establishing care with new doctor, encounter for    Patient had colonoscopy 8 years ago, she will be due when she turns 59. She did have her flu shot this year work. She had a complete blood panel through her work which revealed a mildly elevated LDL, she is not overweight and there is no indication for statin treatment at this time.    2. Pap smear of cervix shows high risk HPV present    Patient due for her Pap smear, she will schedule this with me in the near future.    3. Orthostatic hypotension    Stable and controlled with midodrine, continue current regimen.    4. Major depressive disorder with single episode, in partial remission (CMS-HCC)    Stable and controlled with Lexapro, continue.    5. Mild persistent asthma without complication    Stable, continue inhaler.    6. Hypothyroidism (acquired)      - TSH WITH REFLEX TO FT4; Future    Followup: Patient will call to make an appointment to see me for her Pap and pelvic exam.

## 2018-01-15 ENCOUNTER — HOSPITAL ENCOUNTER (OUTPATIENT)
Dept: LAB | Facility: MEDICAL CENTER | Age: 58
End: 2018-01-15
Attending: INTERNAL MEDICINE
Payer: COMMERCIAL

## 2018-01-15 DIAGNOSIS — E03.9 HYPOTHYROIDISM (ACQUIRED): ICD-10-CM

## 2018-01-15 LAB — TSH SERPL DL<=0.005 MIU/L-ACNC: 2.39 UIU/ML (ref 0.38–5.33)

## 2018-01-15 PROCEDURE — 36415 COLL VENOUS BLD VENIPUNCTURE: CPT

## 2018-01-15 PROCEDURE — 84443 ASSAY THYROID STIM HORMONE: CPT

## 2018-02-06 RX ORDER — LEVOTHYROXINE SODIUM 0.07 MG/1
TABLET ORAL
Qty: 90 TAB | Refills: 0 | OUTPATIENT
Start: 2018-02-06

## 2018-02-06 NOTE — TELEPHONE ENCOUNTER
Was the patient seen in the last year in this department? No     Does patient have an active prescription for medications requested? Yes     Received Request Via: Pharmacy

## 2018-02-07 RX ORDER — LEVOTHYROXINE SODIUM 0.07 MG/1
75 TABLET ORAL
Qty: 90 TAB | Refills: 3 | Status: SHIPPED | OUTPATIENT
Start: 2018-02-07 | End: 2018-12-28 | Stop reason: SDUPTHER

## 2018-02-21 DIAGNOSIS — F32.4 MAJOR DEPRESSIVE DISORDER WITH SINGLE EPISODE, IN PARTIAL REMISSION (HCC): ICD-10-CM

## 2018-02-21 DIAGNOSIS — F32.A DEPRESSION: ICD-10-CM

## 2018-02-22 RX ORDER — ESCITALOPRAM OXALATE 20 MG/1
TABLET ORAL
Qty: 90 TAB | Refills: 2 | Status: SHIPPED | OUTPATIENT
Start: 2018-02-22 | End: 2018-11-12 | Stop reason: SDUPTHER

## 2018-03-08 ENCOUNTER — HOSPITAL ENCOUNTER (OUTPATIENT)
Facility: MEDICAL CENTER | Age: 58
End: 2018-03-08
Attending: INTERNAL MEDICINE
Payer: COMMERCIAL

## 2018-03-08 ENCOUNTER — OFFICE VISIT (OUTPATIENT)
Dept: MEDICAL GROUP | Facility: MEDICAL CENTER | Age: 58
End: 2018-03-08
Payer: COMMERCIAL

## 2018-03-08 VITALS
HEART RATE: 60 BPM | WEIGHT: 145.5 LBS | HEIGHT: 67 IN | BODY MASS INDEX: 22.84 KG/M2 | DIASTOLIC BLOOD PRESSURE: 70 MMHG | SYSTOLIC BLOOD PRESSURE: 102 MMHG | TEMPERATURE: 97.2 F | RESPIRATION RATE: 16 BRPM | OXYGEN SATURATION: 93 %

## 2018-03-08 DIAGNOSIS — Z01.419 ENCOUNTER FOR GYNECOLOGICAL EXAMINATION WITHOUT ABNORMAL FINDING: ICD-10-CM

## 2018-03-08 DIAGNOSIS — Z12.4 ENCOUNTER FOR SCREENING FOR MALIGNANT NEOPLASM OF CERVIX: ICD-10-CM

## 2018-03-08 DIAGNOSIS — R87.810 PAP SMEAR OF CERVIX SHOWS HIGH RISK HPV PRESENT: ICD-10-CM

## 2018-03-08 PROCEDURE — 99396 PREV VISIT EST AGE 40-64: CPT | Performed by: INTERNAL MEDICINE

## 2018-03-08 PROCEDURE — 87624 HPV HI-RISK TYP POOLED RSLT: CPT

## 2018-03-08 PROCEDURE — 88175 CYTOPATH C/V AUTO FLUID REDO: CPT

## 2018-03-09 DIAGNOSIS — R87.810 PAP SMEAR OF CERVIX SHOWS HIGH RISK HPV PRESENT: ICD-10-CM

## 2018-03-09 DIAGNOSIS — Z01.419 ENCOUNTER FOR GYNECOLOGICAL EXAMINATION WITHOUT ABNORMAL FINDING: ICD-10-CM

## 2018-03-09 DIAGNOSIS — Z12.4 ENCOUNTER FOR SCREENING FOR MALIGNANT NEOPLASM OF CERVIX: ICD-10-CM

## 2018-03-09 NOTE — PROGRESS NOTES
Chief Complaint   Patient presents with   • Gynecologic Exam       HISTORY OF PRESENT ILLNESS: Patient is a 57 y.o. female patient who presents today to discuss the evaluation and management of:      Patient had blood work since I last saw her. Her TSH was normal, she will continue on her current dose of 75 µg of Synthroid.    1. Pap smear of cervix shows high risk HPV present    Patient had a Pap smear in July 2016 which showed a high risk HPV genotype present. She was to have a repeat smear done in one year, she is here today to have this done. She had no atypical cells on her last Pap smear, however there was no endocervical component present.  Patient is monogamous with her , she has no symptoms currently. She is postmenopausal.  2. Encounter for gynecological examination without abnormal finding        See above     3. Encounter for screening for malignant neoplasm of cervix            Patient Active Problem List    Diagnosis Date Noted   • Mild persistent asthma without complication 01/04/2018   • Pap smear of cervix shows high risk HPV present 07/20/2016   • Orthostatic hypotension 05/19/2016   • Hypothyroidism (acquired) 04/25/2016   • Depression 04/25/2016        Allergies:Sulfa drugs    Current meds including changes today  Current Outpatient Prescriptions   Medication Sig Dispense Refill   • escitalopram (LEXAPRO) 20 MG tablet TAKE 1 TABLET DAILY 90 Tab 2   • levothyroxine (SYNTHROID) 75 MCG Tab Take 1 Tab by mouth Every morning on an empty stomach. 90 Tab 3   • midodrine (PROAMATINE) 10 MG tablet Every 4 to 5 hours while awake, up to three tabs per day 270 Tab 3   • Nutritional Supplements (ESTROVEN PO) Take  by mouth.     • Mometasone Furo-Formoterol Fum 100-5 MCG/ACT Aerosol Inhale  by mouth.     • budesonide (PULMICORT) 0.5 MG/2ML Suspension 500 mcg every day. Patient uses nebulizer solution in a mary jane pot like applicator with saline solution and performs a nasal irrigation twice a day     •  "montelukast (SINGULAIR) 10 MG Tab Take 10 mg by mouth every day.     • aspirin (ASA) 325 MG Tab Take 325 mg by mouth 2 Times a Day.     • Multiple Vitamin (MULTI VITAMIN DAILY PO) Take 1 tablet by mouth every day.     • Probiotic Product (PROBIOTIC DAILY PO) Take 1 Tab by mouth every day.     • TURMERIC PO Take 1 Tab by mouth every day.     • COD LIVER OIL PO Take 2 Caps by mouth every day.     • Multiple Vitamins-Minerals (AIRBORNE) Chew Tab Take 2 Tabs by mouth 2 Times a Day.       No current facility-administered medications for this visit.      Social History   Substance Use Topics   • Smoking status: Never Smoker   • Smokeless tobacco: Never Used   • Alcohol use 4.2 oz/week     7 Standard drinks or equivalent per week      Comment: 1 a night     Social History     Social History Narrative   • No narrative on file       Family History   Problem Relation Age of Onset   • Heart Disease Father            Exam:      Blood pressure 102/70, pulse 60, temperature 36.2 °C (97.2 °F), resp. rate 16, height 1.702 m (5' 7.01\"), weight 66 kg (145 lb 8.1 oz), SpO2 93 %, not currently breastfeeding.  General:  Well nourished, well developed female in NAD affect and mood within normal limits  Head is grossly normal      GENITOURINARY:  Normal external genitalia, no lesions.  Normal urethral meatus, no masses or tenderness.  Normal bladder without fullness or masses.  Vagina well estrogenized, no vaginal discharge or lesions.  Cervix without lesions or discharge, nontender.  Uterus normal size, shape, and contour, nontender.  Adnexa nontender, no masses.  Normal anus and perineum.    Rectal Exam - not indicated.  Neuro: moves all extremities symmetrically    Please note that this dictation was created using voice recognition software. I have made every reasonable attempt to correct obvious errors, but I expect that there are errors of grammar and possibly content that I did not discover before finalizing the " note.    Assessment/Plan:  1. Pap smear of cervix shows high risk HPV present      - THINPREP PAP WITH HPV; Future    2. Encounter for gynecological examination without abnormal finding    -Normal exam  - THINPREP PAP WITH HPV; Future    3. Encounter for screening for malignant neoplasm of cervix      - THINPREP PAP WITH HPV; Future    Followup: Annually, sooner when necessary

## 2018-03-12 ENCOUNTER — OFFICE VISIT (OUTPATIENT)
Dept: URGENT CARE | Facility: CLINIC | Age: 58
End: 2018-03-12
Payer: COMMERCIAL

## 2018-03-12 VITALS
WEIGHT: 144 LBS | TEMPERATURE: 97.4 F | OXYGEN SATURATION: 97 % | DIASTOLIC BLOOD PRESSURE: 74 MMHG | HEIGHT: 67 IN | SYSTOLIC BLOOD PRESSURE: 102 MMHG | HEART RATE: 69 BPM | RESPIRATION RATE: 14 BRPM | BODY MASS INDEX: 22.6 KG/M2

## 2018-03-12 DIAGNOSIS — H65.01 RIGHT ACUTE SEROUS OTITIS MEDIA, RECURRENCE NOT SPECIFIED: ICD-10-CM

## 2018-03-12 DIAGNOSIS — H60.311 ACUTE DIFFUSE OTITIS EXTERNA OF RIGHT EAR: ICD-10-CM

## 2018-03-12 PROCEDURE — 99214 OFFICE O/P EST MOD 30 MIN: CPT | Performed by: NURSE PRACTITIONER

## 2018-03-12 RX ORDER — AMOXICILLIN 500 MG/1
500 CAPSULE ORAL 3 TIMES DAILY
Qty: 21 CAP | Refills: 0 | Status: SHIPPED | OUTPATIENT
Start: 2018-03-12 | End: 2018-03-19

## 2018-03-12 ASSESSMENT — ENCOUNTER SYMPTOMS
SINUS PAIN: 0
VOMITING: 0
DIZZINESS: 1
NAUSEA: 0
HEADACHES: 0
COUGH: 0
FEVER: 0
CHILLS: 0

## 2018-03-13 LAB
CYTOLOGY REG CYTOL: NORMAL
HPV HR 12 DNA CVX QL NAA+PROBE: NEGATIVE
HPV16 DNA SPEC QL NAA+PROBE: NEGATIVE
HPV18 DNA SPEC QL NAA+PROBE: NEGATIVE
SPECIMEN SOURCE: NORMAL

## 2018-03-13 NOTE — PROGRESS NOTES
"Subjective:      Kalie Cook is a 57 y.o. female who presents with Otalgia    Past Medical History:   Diagnosis Date   • Asthma      Social History     Social History   • Marital status:      Spouse name: N/A   • Number of children: N/A   • Years of education: N/A     Occupational History   • Not on file.     Social History Main Topics   • Smoking status: Never Smoker   • Smokeless tobacco: Never Used   • Alcohol use 4.2 oz/week     7 Standard drinks or equivalent per week      Comment: 1 a night   • Drug use: No   • Sexual activity: Yes     Partners: Male     Other Topics Concern   • Caffeine Concern No   • Weight Concern No   • Special Diet No   • Exercise Yes     Social History Narrative   • No narrative on file     Family History   Problem Relation Age of Onset   • Heart Disease Father      Allergies: Sulfa drugs    Patient is a 49-year-old female who presents with complaint of fullness and discomfort with tinnitus and dizziness in her right ear. Symptoms started 3 days ago. Denies any fever, aches, or chills. No other upper respiratory symptoms at this time.          Otalgia    There is pain in the right ear. This is a new problem. The current episode started in the past 7 days. The problem occurs constantly. The problem has been unchanged. There has been no fever. Pertinent negatives include no coughing, ear discharge, headaches, hearing loss or vomiting. She has tried nothing for the symptoms. The treatment provided no relief.       Review of Systems   Constitutional: Negative for chills, fever and malaise/fatigue.   HENT: Positive for ear pain and tinnitus. Negative for congestion, ear discharge, hearing loss and sinus pain.    Respiratory: Negative for cough.    Gastrointestinal: Negative for nausea and vomiting.   Neurological: Positive for dizziness. Negative for headaches.          Objective:     /74   Pulse 69   Temp 36.3 °C (97.4 °F)   Resp 14   Ht 1.702 m (5' 7\")   Wt 65.3 kg (144 " lb)   SpO2 97%   BMI 22.55 kg/m²      Physical Exam   Constitutional: She is oriented to person, place, and time. She appears well-developed and well-nourished.   HENT:   Head: Normocephalic and atraumatic.   Left Ear: External ear normal.   Nose: Nose normal.   Mouth/Throat: Oropharynx is clear and moist. No oropharyngeal exudate.   Right TM is occluded with impacted cerumen.   Eyes: Conjunctivae and EOM are normal. Pupils are equal, round, and reactive to light. Right eye exhibits no discharge. Left eye exhibits no discharge.   Neck: Normal range of motion. Neck supple.   Cardiovascular: Normal rate and regular rhythm.    Pulmonary/Chest: Effort normal and breath sounds normal.   Musculoskeletal: Normal range of motion.   Neurological: She is alert and oriented to person, place, and time.   Skin: Skin is warm and dry. Capillary refill takes less than 2 seconds.   Psychiatric: She has a normal mood and affect. Her behavior is normal. Judgment and thought content normal.   Vitals reviewed.    Post-lavage: Moderate amount of cerumen expectorated from the right EAC. TM is clearly visible, appears to be red with small amount of white exudate in the EAC.          Assessment/Plan:      Right otalgia  AOM right  Right otitis externa    Tinnitus, right ear  -Flonase  Follow-up with primary doctor or ENT for persistent symptoms-  -amoxil  -cortisporin otic    There are no diagnoses linked to this encounter.

## 2018-09-08 ENCOUNTER — HOSPITAL ENCOUNTER (OUTPATIENT)
Facility: MEDICAL CENTER | Age: 58
End: 2018-09-08
Payer: COMMERCIAL

## 2018-09-08 LAB
BDY FAT % MEASURED: 32.6 %
BP DIAS: 70 MMHG
BP SYS: 108 MMHG
CHOLEST SERPL-MCNC: 206 MG/DL (ref 100–199)
DIABETES HTDIA: NO
EVENT NAME HTEVT: NORMAL
FASTING STATUS PATIENT QL REPORTED: NORMAL
GLUCOSE SERPL-MCNC: 89 MG/DL (ref 65–99)
HDLC SERPL-MCNC: 89 MG/DL
HYPERTENSION HTHYP: NO
LDLC SERPL CALC-MCNC: 102 MG/DL
SCREENING LOC CITY HTCIT: NORMAL
SCREENING LOC STATE HTSTA: NORMAL
SCREENING LOCATION HTLOC: NORMAL
SUBSCRIBER ID HTSID: NORMAL
TRIGL SERPL-MCNC: 77 MG/DL (ref 0–149)

## 2018-09-26 ENCOUNTER — APPOINTMENT (OUTPATIENT)
Dept: RADIOLOGY | Facility: MEDICAL CENTER | Age: 58
End: 2018-09-26
Attending: INTERNAL MEDICINE
Payer: COMMERCIAL

## 2018-10-29 ENCOUNTER — HOSPITAL ENCOUNTER (OUTPATIENT)
Dept: RADIOLOGY | Facility: MEDICAL CENTER | Age: 58
End: 2018-10-29
Attending: INTERNAL MEDICINE
Payer: COMMERCIAL

## 2018-10-29 DIAGNOSIS — Z12.31 VISIT FOR SCREENING MAMMOGRAM: ICD-10-CM

## 2018-10-29 PROCEDURE — 77067 SCR MAMMO BI INCL CAD: CPT

## 2018-10-29 PROCEDURE — 77063 BREAST TOMOSYNTHESIS BI: CPT

## 2018-11-12 DIAGNOSIS — F32.4 MAJOR DEPRESSIVE DISORDER WITH SINGLE EPISODE, IN PARTIAL REMISSION (HCC): ICD-10-CM

## 2018-11-13 RX ORDER — ESCITALOPRAM OXALATE 20 MG/1
TABLET ORAL
Qty: 90 TAB | Refills: 0 | Status: SHIPPED | OUTPATIENT
Start: 2018-11-13 | End: 2019-01-27 | Stop reason: SDUPTHER

## 2018-12-10 DIAGNOSIS — R55 VASOVAGAL SYNCOPE: ICD-10-CM

## 2018-12-10 RX ORDER — MIDODRINE HYDROCHLORIDE 10 MG/1
TABLET ORAL
Qty: 270 TAB | Refills: 0 | Status: SHIPPED | OUTPATIENT
Start: 2018-12-10 | End: 2019-03-01 | Stop reason: SDUPTHER

## 2018-12-27 ENCOUNTER — OFFICE VISIT (OUTPATIENT)
Dept: MEDICAL GROUP | Facility: MEDICAL CENTER | Age: 58
End: 2018-12-27
Payer: COMMERCIAL

## 2018-12-27 VITALS
DIASTOLIC BLOOD PRESSURE: 84 MMHG | WEIGHT: 153.6 LBS | HEIGHT: 67 IN | TEMPERATURE: 97 F | SYSTOLIC BLOOD PRESSURE: 142 MMHG | RESPIRATION RATE: 16 BRPM | HEART RATE: 64 BPM | BODY MASS INDEX: 24.11 KG/M2 | OXYGEN SATURATION: 94 %

## 2018-12-27 DIAGNOSIS — F32.0 CURRENT MILD EPISODE OF MAJOR DEPRESSIVE DISORDER, UNSPECIFIED WHETHER RECURRENT (HCC): ICD-10-CM

## 2018-12-27 PROCEDURE — 99213 OFFICE O/P EST LOW 20 MIN: CPT | Performed by: INTERNAL MEDICINE

## 2018-12-27 ASSESSMENT — PATIENT HEALTH QUESTIONNAIRE - PHQ9
7. TROUBLE CONCENTRATING ON THINGS, SUCH AS READING THE NEWSPAPER OR WATCHING TELEVISION: NOT AT ALL
1. LITTLE INTEREST OR PLEASURE IN DOING THINGS: NOT AT ALL
3. TROUBLE FALLING OR STAYING ASLEEP OR SLEEPING TOO MUCH: NOT AT ALL
SUM OF ALL RESPONSES TO PHQ QUESTIONS 1-9: 0
8. MOVING OR SPEAKING SO SLOWLY THAT OTHER PEOPLE COULD HAVE NOTICED. OR THE OPPOSITE, BEING SO FIGETY OR RESTLESS THAT YOU HAVE BEEN MOVING AROUND A LOT MORE THAN USUAL: NOT AT ALL
4. FEELING TIRED OR HAVING LITTLE ENERGY: NOT AT ALL
5. POOR APPETITE OR OVEREATING: NOT AT ALL
9. THOUGHTS THAT YOU WOULD BE BETTER OFF DEAD, OR OF HURTING YOURSELF: NOT AT ALL
6. FEELING BAD ABOUT YOURSELF - OR THAT YOU ARE A FAILURE OR HAVE LET YOURSELF OR YOUR FAMILY DOWN: NOT AL ALL
SUM OF ALL RESPONSES TO PHQ9 QUESTIONS 1 AND 2: 0
2. FEELING DOWN, DEPRESSED, IRRITABLE, OR HOPELESS: NOT AT ALL

## 2018-12-27 NOTE — PROGRESS NOTES
Chief Complaint   Patient presents with   • Results     discuss lipids        HISTORY OF PRESENT ILLNESS: Patient is a 58 y.o. female patient who presents today to discuss the evaluation and management of:      Patient's initial concern was her cholesterol, she had wellness screening in September which revealed it was actually improved from 1 year ago.  She had Ms. read it and thought it was elevated.  It was actually down 30 points from when it was measured in 2017.  Results for LIONEL SOLIMAN (MRN [id number]) as of 12/27/2018 11:36   Ref. Range 9/8/2018 09:31   Cholesterol,Tot Latest Ref Range: 100 - 199 mg/dL 206 (H)   Triglycerides Latest Ref Range: 0 - 149 mg/dL 77   HDL Latest Ref Range: >=40 mg/dL 89   LDL Latest Ref Range: <100 mg/dL 102 (H)       1. Current mild episode of major depressive disorder, unspecified whether recurrent (HCC)      Patient would like to discuss her Lexapro, she has been on it for greater than 1 year and is doing very well.  She is interested in trying to taper it down.  She has 20 mg tablets which she can cut in half.    In general, she is doing well, although she is stressed with her job as a .  They are on 3-1/2-week winter break currently.      Patient Active Problem List    Diagnosis Date Noted   • Mild persistent asthma without complication 01/04/2018   • Orthostatic hypotension 05/19/2016   • Hypothyroidism (acquired) 04/25/2016   • Depression 04/25/2016        Allergies:Sulfa drugs    Current meds including changes today  Current Outpatient Prescriptions   Medication Sig Dispense Refill   • midodrine (PROAMATINE) 10 MG tablet Every 4 to 5 hours while awake, up to three tabs per day 270 Tab 0   • escitalopram (LEXAPRO) 20 MG tablet TAKE 1 TABLET DAILY 90 Tab 0   • levothyroxine (SYNTHROID) 75 MCG Tab Take 1 Tab by mouth Every morning on an empty stomach. 90 Tab 3   • Nutritional Supplements (ESTROVEN PO) Take  by mouth.     • Mometasone Furo-Formoterol Fum  "100-5 MCG/ACT Aerosol Inhale  by mouth.     • budesonide (PULMICORT) 0.5 MG/2ML Suspension 500 mcg every day. Patient uses nebulizer solution in a mary jane pot like applicator with saline solution and performs a nasal irrigation twice a day     • montelukast (SINGULAIR) 10 MG Tab Take 10 mg by mouth every day.     • aspirin (ASA) 325 MG Tab Take 325 mg by mouth 2 Times a Day.     • Multiple Vitamin (MULTI VITAMIN DAILY PO) Take 1 tablet by mouth every day.     • Probiotic Product (PROBIOTIC DAILY PO) Take 1 Tab by mouth every day.     • TURMERIC PO Take 1 Tab by mouth every day.     • COD LIVER OIL PO Take 2 Caps by mouth every day.     • Multiple Vitamins-Minerals (AIRBORNE) Chew Tab Take 2 Tabs by mouth 2 Times a Day.       No current facility-administered medications for this visit.      Social History   Substance Use Topics   • Smoking status: Never Smoker   • Smokeless tobacco: Never Used   • Alcohol use 4.2 oz/week     7 Standard drinks or equivalent per week      Comment: 1 a night     Social History     Social History Narrative   • No narrative on file       Family History   Problem Relation Age of Onset   • Heart Disease Father      on        Exam:      Blood pressure 142/84, pulse 64, temperature 36.1 °C (97 °F), temperature source Temporal, resp. rate 16, height 1.702 m (5' 7\"), weight 69.7 kg (153 lb 9.6 oz), SpO2 94 %, not currently breastfeeding.  General:  Well nourished, well developed female in NAD affect and mood within normal limits  Head is grossly normal.  Neck: Supple without adenopathy  Pulmonary: Clear to ausculation.  Normal effort. No rales, rhonchi, or wheezing.  Cardiovascular: Regular rate and rhythm without murmur.   Extremities: no clubbing, cyanosis, or edema.  Neuro: moves all extremities symmetrically    Please note that this dictation was created using voice recognition software. I have made every reasonable attempt to correct obvious errors, but I expect that there are errors of " grammar and possibly content that I did not discover before finalizing the note.    Assessment/Plan:  1. Current mild episode of major depressive disorder, unspecified whether recurrent (HCC)    Patient is going to cut her Lexapro 20 mg tabs in half.  She will let me know in several weeks if she would like to continue with 10 mg tab and I will call these in.  Otherwise she will resume 20s and those will be refilled.    Otherwise, patient is up-to-date with her health screening, she has gained a few pounds and is going to try to exercise a little more.  Her last Pap smear was in March and was negative.    Followup: No Follow-up on file.

## 2018-12-28 RX ORDER — LEVOTHYROXINE SODIUM 0.07 MG/1
75 TABLET ORAL
Qty: 90 TAB | Refills: 2 | Status: SHIPPED | OUTPATIENT
Start: 2018-12-28 | End: 2019-07-19 | Stop reason: SDUPTHER

## 2019-01-27 DIAGNOSIS — F32.4 MAJOR DEPRESSIVE DISORDER WITH SINGLE EPISODE, IN PARTIAL REMISSION (HCC): ICD-10-CM

## 2019-01-28 RX ORDER — ESCITALOPRAM OXALATE 20 MG/1
TABLET ORAL
Qty: 90 TAB | Refills: 1 | Status: SHIPPED | OUTPATIENT
Start: 2019-01-28 | End: 2019-06-26 | Stop reason: SDUPTHER

## 2019-02-20 DIAGNOSIS — R55 VASOVAGAL SYNCOPE: ICD-10-CM

## 2019-02-20 RX ORDER — MIDODRINE HYDROCHLORIDE 10 MG/1
TABLET ORAL
Qty: 270 TAB | Refills: 0 | Status: CANCELLED | OUTPATIENT
Start: 2019-02-20

## 2019-03-01 DIAGNOSIS — R55 VASOVAGAL SYNCOPE: ICD-10-CM

## 2019-03-01 RX ORDER — MIDODRINE HYDROCHLORIDE 10 MG/1
TABLET ORAL
Qty: 270 TAB | Refills: 0 | Status: SHIPPED | OUTPATIENT
Start: 2019-03-01 | End: 2019-05-15 | Stop reason: SDUPTHER

## 2019-04-01 ENCOUNTER — OFFICE VISIT (OUTPATIENT)
Dept: CARDIOLOGY | Facility: MEDICAL CENTER | Age: 59
End: 2019-04-01
Payer: COMMERCIAL

## 2019-04-01 VITALS
DIASTOLIC BLOOD PRESSURE: 70 MMHG | HEIGHT: 67 IN | HEART RATE: 68 BPM | SYSTOLIC BLOOD PRESSURE: 122 MMHG | BODY MASS INDEX: 24.01 KG/M2 | OXYGEN SATURATION: 96 % | WEIGHT: 153 LBS

## 2019-04-01 DIAGNOSIS — R55 SYNCOPE, VASOVAGAL: ICD-10-CM

## 2019-04-01 LAB — EKG IMPRESSION: NORMAL

## 2019-04-01 PROCEDURE — 99213 OFFICE O/P EST LOW 20 MIN: CPT | Performed by: NURSE PRACTITIONER

## 2019-04-01 PROCEDURE — 93000 ELECTROCARDIOGRAM COMPLETE: CPT | Performed by: INTERNAL MEDICINE

## 2019-04-01 ASSESSMENT — ENCOUNTER SYMPTOMS
BLOOD IN STOOL: 0
VOMITING: 0
ABDOMINAL PAIN: 0
HEMOPTYSIS: 0
BLURRED VISION: 0
WEIGHT LOSS: 0
DOUBLE VISION: 0
LOSS OF CONSCIOUSNESS: 0
DIZZINESS: 0
NAUSEA: 0
CHILLS: 0
PND: 0
SENSORY CHANGE: 0
PALPITATIONS: 0
COUGH: 0
HEARTBURN: 0
ORTHOPNEA: 0
TINGLING: 0
SPEECH CHANGE: 0
SPUTUM PRODUCTION: 0
HEADACHES: 0
TREMORS: 0
STRIDOR: 0
SHORTNESS OF BREATH: 0
SORE THROAT: 0
DIARRHEA: 0
FEVER: 0
WHEEZING: 0
FOCAL WEAKNESS: 0

## 2019-04-01 NOTE — PROGRESS NOTES
Cardiology/Electrophysiology Follow-up Note      Subjective:   Chief Complaint:   Chief Complaint   Patient presents with   • Syncope     & collapse       Kalie Cook is a 58 y.o. female who presents today for follow up vasovagal syncope.     She is followed by Dr. Peres.  Past medical history also significant for orthostatic hypotension, previously negative BLAIR.    Today in follow up that she has done very well since the last time she was seen.  She has not had any further syncopal or orthostatic symptoms.  She remains on the Midodrine.  She denies chest pain, dizziness, palpitations, pre syncope or syncope, dyspnea, PND, orthopnea, or lower extremity edema.      Patient endorses medication compliance and good compliance with her aggressive hydration/sodium intake.  Does not always wear compression socks.           Past Medical History:   Diagnosis Date   • Asthma      Past Surgical History:   Procedure Laterality Date   • ABDOMINAL EXPLORATION     • PRIMARY C SECTION     • SINUSOTOMIES       Family History   Problem Relation Age of Onset   • Heart Disease Father      Social History     Social History   • Marital status:      Spouse name: N/A   • Number of children: N/A   • Years of education: N/A     Occupational History   • Not on file.     Social History Main Topics   • Smoking status: Never Smoker   • Smokeless tobacco: Never Used   • Alcohol use 4.2 oz/week     7 Standard drinks or equivalent per week      Comment: 1 a night   • Drug use: No   • Sexual activity: Yes     Partners: Male     Other Topics Concern   • Caffeine Concern No   • Weight Concern No   • Special Diet No   • Exercise Yes     Social History Narrative   • No narrative on file     Allergies   Allergen Reactions   • Sulfa Drugs Hives       Current Outpatient Prescriptions   Medication Sig Dispense Refill   • midodrine (PROAMATINE) 10 MG tablet Every 4 to 5 hours while awake, up to three tabs per day 270 Tab 0   • escitalopram  "(LEXAPRO) 20 MG tablet TAKE 1 TABLET DAILY 90 Tab 1   • levothyroxine (SYNTHROID) 75 MCG Tab Take 1 Tab by mouth Every morning on an empty stomach. 90 Tab 2   • Nutritional Supplements (ESTROVEN PO) Take  by mouth.     • Mometasone Furo-Formoterol Fum 100-5 MCG/ACT Aerosol Inhale  by mouth.     • budesonide (PULMICORT) 0.5 MG/2ML Suspension 500 mcg every day. Patient uses nebulizer solution in a mary jane pot like applicator with saline solution and performs a nasal irrigation twice a day     • montelukast (SINGULAIR) 10 MG Tab Take 10 mg by mouth every day.     • aspirin (ASA) 325 MG Tab Take 325 mg by mouth 2 Times a Day.     • Multiple Vitamin (MULTI VITAMIN DAILY PO) Take 1 tablet by mouth every day.     • Probiotic Product (PROBIOTIC DAILY PO) Take 1 Tab by mouth every day.     • TURMERIC PO Take 1 Tab by mouth every day.     • COD LIVER OIL PO Take 2 Caps by mouth every day.     • Multiple Vitamins-Minerals (AIRBORNE) Chew Tab Take 2 Tabs by mouth 2 Times a Day.       No current facility-administered medications for this visit.        Review of Systems   Constitutional: Negative for chills, fever, malaise/fatigue and weight loss.   HENT: Negative for congestion, nosebleeds, sore throat and tinnitus.    Eyes: Negative for blurred vision and double vision.   Respiratory: Negative for cough, hemoptysis, sputum production, shortness of breath, wheezing and stridor.    Cardiovascular: Negative for chest pain, palpitations, orthopnea, leg swelling and PND.   Gastrointestinal: Negative for abdominal pain, blood in stool, diarrhea, heartburn, nausea and vomiting.   Skin: Negative for rash.   Neurological: Negative for dizziness, tingling, tremors, sensory change, speech change, focal weakness, loss of consciousness and headaches.     All others systems reviewed and negative.     Objective:     Blood pressure 122/70, pulse 68, height 1.702 m (5' 7\"), weight 69.4 kg (153 lb), SpO2 96 %, not currently breastfeeding. Body " mass index is 23.96 kg/m².    Physical Exam   Constitutional: She is oriented to person, place, and time and well-developed, well-nourished, and in no distress.   HENT:   Head: Normocephalic and atraumatic.   Eyes: Pupils are equal, round, and reactive to light. Conjunctivae and EOM are normal.   Neck: Normal range of motion. Neck supple. No JVD present.   Cardiovascular: Normal rate, regular rhythm, normal heart sounds and intact distal pulses.  Exam reveals no gallop and no friction rub.    No murmur heard.  No carotid bruits bilaterally    Pulmonary/Chest: Effort normal and breath sounds normal. No respiratory distress. She has no wheezes. She has no rales. She exhibits no tenderness.   Abdominal: Soft. Bowel sounds are normal. There is no tenderness.   Musculoskeletal: Normal range of motion. She exhibits no edema.   Neurological: She is alert and oriented to person, place, and time.   Skin: Skin is warm and dry. No rash noted. No erythema.   Psychiatric: Mood, memory, affect and judgment normal.         Cardiac Imaging and Procedures Review:    EKG dated 4/1/19:   Sinus latoya, rate 55.     Echo dated 3/1/16:   Left Ventricle  Normal left ventricular size, thickness, systolic function, and   diastolic function. Left ventricular ejection fraction is visually   estimated to be 60%. Normal regional wall motion.    Right Ventricle  The right ventricle was normal in size and function.    Right Atrium  The right atrium is normal in size. Normal inferior vena cava size and   inspiratory collapse.    Left Atrium  The left atrium is normal in size. Left atrial volume index is 18 mL/sq       Mitral Valve  Mild mitral annular calcification. Normal mitral valve leaflets without   mitral regurgitation.    Aortic Valve  Mild aortic sclerosis without stenosis. No aortic insufficiency.    Tricuspid Valve  Structurally normal tricuspid valve without significant stenosis. Trace   tricuspid regurgitation. Right atrial pressure is  estimated to be 15   mmHg. Right heart pressures are normal.    Pulmonic Valve  The pulmonic valve is not well visualized. No pulmonic stenosis. Trace   pulmonic insufficiency.    Pericardium  Normal pericardium without effusion.    Aorta  The aortic root, 3.8 cm. Ascending aorta not well visualized.      Assessment:     1. Syncope, vasovagal  EKG   2. Orthostatic hypotension         Medical Decision Making:  Today's Assessment / Status / Plan:   1. Vasovagal Syncope:  - Stable.  No episidoes since last office visit.    - Continue Midodrine.  Continue aggressive hydration, sodium intake, compression stockings.     Plan reviewed in detail with the patient and she verbalizes understanding and is in agreement.   RTC in one year, sooner if clinical condition changes  Collaborating MD/ADD: SHERRI Hess.

## 2019-04-01 NOTE — LETTER
[facility]   [address], [address]  [place] [address]  Phone: [phone]  Fax: [phone]              Kalie Cook  1960    Encounter Date: 4/1/2019    GISELL Dubois          PROGRESS NOTE:  Cardiology/Electrophysiology Follow-up Note      Subjective:   Chief Complaint:   Chief Complaint   Patient presents with   • Syncope     & collapse       Kalie Cook is a 58 y.o. female who presents today for follow up vasovagal syncope.     She is followed by Dr. Peres.  Past medical history also significant for orthostatic hypotension, previously negative BLAIR.    Today in follow up that she has done very well since the last time she was seen.  She has not had any further syncopal or orthostatic symptoms.  She remains on the Midodrine.  She denies chest pain, dizziness, palpitations, pre syncope or syncope, dyspnea, PND, orthopnea, or lower extremity edema.      Patient endorses medication compliance and good compliance with her aggressive hydration/sodium intake.  Does not always wear compression socks.           Past Medical History:   Diagnosis Date   • Asthma      Past Surgical History:   Procedure Laterality Date   • ABDOMINAL EXPLORATION     • PRIMARY C SECTION     • SINUSOTOMIES       Family History   Problem Relation Age of Onset   • Heart Disease Father      Social History     Social History   • Marital status:      Spouse name: N/A   • Number of children: N/A   • Years of education: N/A     Occupational History   • Not on file.     Social History Main Topics   • Smoking status: Never Smoker   • Smokeless tobacco: Never Used   • Alcohol use 4.2 oz/week     7 Standard drinks or equivalent per week      Comment: 1 a night   • Drug use: No   • Sexual activity: Yes     Partners: Male     Other Topics Concern   • Caffeine Concern No   • Weight Concern No   • Special Diet No   • Exercise Yes     Social History Narrative   • No narrative on  file     Allergies   Allergen Reactions   • Sulfa Drugs Hives       Current Outpatient Prescriptions   Medication Sig Dispense Refill   • midodrine (PROAMATINE) 10 MG tablet Every 4 to 5 hours while awake, up to three tabs per day 270 Tab 0   • escitalopram (LEXAPRO) 20 MG tablet TAKE 1 TABLET DAILY 90 Tab 1   • levothyroxine (SYNTHROID) 75 MCG Tab Take 1 Tab by mouth Every morning on an empty stomach. 90 Tab 2   • Nutritional Supplements (ESTROVEN PO) Take  by mouth.     • Mometasone Furo-Formoterol Fum 100-5 MCG/ACT Aerosol Inhale  by mouth.     • budesonide (PULMICORT) 0.5 MG/2ML Suspension 500 mcg every day. Patient uses nebulizer solution in a mary jane pot like applicator with saline solution and performs a nasal irrigation twice a day     • montelukast (SINGULAIR) 10 MG Tab Take 10 mg by mouth every day.     • aspirin (ASA) 325 MG Tab Take 325 mg by mouth 2 Times a Day.     • Multiple Vitamin (MULTI VITAMIN DAILY PO) Take 1 tablet by mouth every day.     • Probiotic Product (PROBIOTIC DAILY PO) Take 1 Tab by mouth every day.     • TURMERIC PO Take 1 Tab by mouth every day.     • COD LIVER OIL PO Take 2 Caps by mouth every day.     • Multiple Vitamins-Minerals (AIRBORNE) Chew Tab Take 2 Tabs by mouth 2 Times a Day.       No current facility-administered medications for this visit.        Review of Systems   Constitutional: Negative for chills, fever, malaise/fatigue and weight loss.   HENT: Negative for congestion, nosebleeds, sore throat and tinnitus.    Eyes: Negative for blurred vision and double vision.   Respiratory: Negative for cough, hemoptysis, sputum production, shortness of breath, wheezing and stridor.    Cardiovascular: Negative for chest pain, palpitations, orthopnea, leg swelling and PND.   Gastrointestinal: Negative for abdominal pain, blood in stool, diarrhea, heartburn, nausea and vomiting.   Skin: Negative for rash.   Neurological: Negative for dizziness, tingling, tremors, sensory change,  "speech change, focal weakness, loss of consciousness and headaches.     All others systems reviewed and negative.     Objective:     Blood pressure 122/70, pulse 68, height 1.702 m (5' 7\"), weight 69.4 kg (153 lb), SpO2 96 %, not currently breastfeeding. Body mass index is 23.96 kg/m².    Physical Exam   Constitutional: She is oriented to person, place, and time and well-developed, well-nourished, and in no distress.   HENT:   Head: Normocephalic and atraumatic.   Eyes: Pupils are equal, round, and reactive to light. Conjunctivae and EOM are normal.   Neck: Normal range of motion. Neck supple. No JVD present.   Cardiovascular: Normal rate, regular rhythm, normal heart sounds and intact distal pulses.  Exam reveals no gallop and no friction rub.    No murmur heard.  No carotid bruits bilaterally    Pulmonary/Chest: Effort normal and breath sounds normal. No respiratory distress. She has no wheezes. She has no rales. She exhibits no tenderness.   Abdominal: Soft. Bowel sounds are normal. There is no tenderness.   Musculoskeletal: Normal range of motion. She exhibits no edema.   Neurological: She is alert and oriented to person, place, and time.   Skin: Skin is warm and dry. No rash noted. No erythema.   Psychiatric: Mood, memory, affect and judgment normal.         Cardiac Imaging and Procedures Review:    EKG dated 4/1/19:   Sinus latoya, rate 55.     Echo dated 3/1/16:   Left Ventricle  Normal left ventricular size, thickness, systolic function, and   diastolic function. Left ventricular ejection fraction is visually   estimated to be 60%. Normal regional wall motion.    Right Ventricle  The right ventricle was normal in size and function.    Right Atrium  The right atrium is normal in size. Normal inferior vena cava size and   inspiratory collapse.    Left Atrium  The left atrium is normal in size. Left atrial volume index is 18 mL/sq       Mitral Valve  Mild mitral annular calcification. Normal mitral valve " leaflets without   mitral regurgitation.    Aortic Valve  Mild aortic sclerosis without stenosis. No aortic insufficiency.    Tricuspid Valve  Structurally normal tricuspid valve without significant stenosis. Trace   tricuspid regurgitation. Right atrial pressure is estimated to be 15   mmHg. Right heart pressures are normal.    Pulmonic Valve  The pulmonic valve is not well visualized. No pulmonic stenosis. Trace   pulmonic insufficiency.    Pericardium  Normal pericardium without effusion.    Aorta  The aortic root, 3.8 cm. Ascending aorta not well visualized.      Assessment:     1. Syncope, vasovagal  EKG   2. Orthostatic hypotension         Medical Decision Making:  Today's Assessment / Status / Plan:   1. Vasovagal Syncope:  - Stable.  No episidoes since last office visit.    - Continue Midodrine.  Continue aggressive hydration, sodium intake, compression stockings.     Plan reviewed in detail with the patient and she verbalizes understanding and is in agreement.   RTC in one year, sooner if clinical condition changes  Collaborating MD/ADD: GISELL Hess MD  [address]  [address] [place] [id number]  VIA In Basket

## 2019-05-15 DIAGNOSIS — R55 VASOVAGAL SYNCOPE: ICD-10-CM

## 2019-05-15 RX ORDER — MIDODRINE HYDROCHLORIDE 10 MG/1
TABLET ORAL
Qty: 270 TAB | Refills: 3 | Status: SHIPPED | OUTPATIENT
Start: 2019-05-15 | End: 2021-07-21

## 2019-05-28 NOTE — PROGRESS NOTES
Order(s) created erroneously. Erroneous order ID: [id number]   Order moved by: KARLOS JOHNS   Order move date/time: 05/28/2019 10:18 AM   Source Patient: [id number]   Source Contact: 04/01/2019   Destination Patient: [id number]   Destination Contact: 02/08/2016

## 2019-06-26 DIAGNOSIS — F32.4 MAJOR DEPRESSIVE DISORDER WITH SINGLE EPISODE, IN PARTIAL REMISSION (HCC): ICD-10-CM

## 2019-06-27 RX ORDER — ESCITALOPRAM OXALATE 20 MG/1
TABLET ORAL
Qty: 90 TAB | Refills: 0 | Status: SHIPPED
Start: 2019-06-27 | End: 2020-06-24

## 2019-07-19 RX ORDER — LEVOTHYROXINE SODIUM 0.07 MG/1
75 TABLET ORAL
Qty: 90 TAB | Refills: 2 | Status: SHIPPED | OUTPATIENT
Start: 2019-07-19 | End: 2019-07-24 | Stop reason: SDUPTHER

## 2019-07-19 NOTE — TELEPHONE ENCOUNTER
Was the patient seen in the last year in this department? Yes    Does patient have an active prescription for medications requested? No     Received Request Via: Pharmacy     Future Appointments       Provider Department [place]    6/24/2020 10:00 AM Madeline Anderson M.D. [facility]

## 2019-07-24 RX ORDER — LEVOTHYROXINE SODIUM 0.07 MG/1
75 TABLET ORAL
Qty: 90 TAB | Refills: 2 | Status: SHIPPED | OUTPATIENT
Start: 2019-07-24 | End: 2019-08-05 | Stop reason: SDUPTHER

## 2019-07-24 NOTE — TELEPHONE ENCOUNTER
Patient requesting to be sent to [facility].    Was the patient seen in the last year in this department? Yes    Does patient have an active prescription for medications requested? No     Received Request Via: Patient     Future Appointments       Provider Department [facility]    6/24/2020 10:00 AM Madeline Anderson M.D. [facility]

## 2019-08-05 RX ORDER — LEVOTHYROXINE SODIUM 0.07 MG/1
75 TABLET ORAL
Qty: 90 TAB | Refills: 2 | Status: SHIPPED | OUTPATIENT
Start: 2019-08-05 | End: 2019-11-04 | Stop reason: SDUPTHER

## 2019-10-16 ENCOUNTER — IMMUNIZATION (OUTPATIENT)
Dept: SOCIAL WORK | Facility: CLINIC | Age: 59
End: 2019-10-16
Payer: COMMERCIAL

## 2019-10-16 DIAGNOSIS — Z23 NEED FOR VACCINATION: ICD-10-CM

## 2019-10-16 PROCEDURE — 90471 IMMUNIZATION ADMIN: CPT | Performed by: REGISTERED NURSE

## 2019-10-16 PROCEDURE — 90686 IIV4 VACC NO PRSV 0.5 ML IM: CPT | Performed by: REGISTERED NURSE

## 2019-11-04 ENCOUNTER — OFFICE VISIT (OUTPATIENT)
Dept: CARDIOLOGY | Facility: MEDICAL CENTER | Age: 59
End: 2019-11-04
Payer: COMMERCIAL

## 2019-11-04 VITALS
OXYGEN SATURATION: 96 % | DIASTOLIC BLOOD PRESSURE: 84 MMHG | HEIGHT: 67 IN | SYSTOLIC BLOOD PRESSURE: 142 MMHG | WEIGHT: 154.6 LBS | HEART RATE: 70 BPM | BODY MASS INDEX: 24.27 KG/M2

## 2019-11-04 DIAGNOSIS — I95.1 ORTHOSTATIC HYPOTENSION: ICD-10-CM

## 2019-11-04 DIAGNOSIS — I15.9 SECONDARY HYPERTENSION: ICD-10-CM

## 2019-11-04 DIAGNOSIS — E78.5 HYPERLIPIDEMIA, UNSPECIFIED HYPERLIPIDEMIA TYPE: ICD-10-CM

## 2019-11-04 PROCEDURE — 99213 OFFICE O/P EST LOW 20 MIN: CPT | Performed by: NURSE PRACTITIONER

## 2019-11-04 RX ORDER — LEVOTHYROXINE SODIUM 0.07 MG/1
75 TABLET ORAL
Qty: 90 TAB | Refills: 0 | Status: SHIPPED | OUTPATIENT
Start: 2019-11-04 | End: 2020-03-04 | Stop reason: SDUPTHER

## 2019-11-04 RX ORDER — ZOSTER VACCINE RECOMBINANT, ADJUVANTED 50 MCG/0.5
KIT INTRAMUSCULAR
COMMUNITY
Start: 2019-10-14 | End: 2019-11-04

## 2019-11-04 ASSESSMENT — ENCOUNTER SYMPTOMS
BLOOD IN STOOL: 0
PALPITATIONS: 0
CHILLS: 0
SPUTUM PRODUCTION: 0
SPEECH CHANGE: 0
BLURRED VISION: 0
NAUSEA: 0
WEIGHT LOSS: 0
PND: 0
DIZZINESS: 0
DIARRHEA: 0
TREMORS: 0
SHORTNESS OF BREATH: 0
SENSORY CHANGE: 0
HEARTBURN: 0
ORTHOPNEA: 0
SORE THROAT: 0
ABDOMINAL PAIN: 0
COUGH: 0
FEVER: 0
TINGLING: 0
HEADACHES: 0
LOSS OF CONSCIOUSNESS: 0
WHEEZING: 0
HEMOPTYSIS: 0
DOUBLE VISION: 0
FOCAL WEAKNESS: 0
VOMITING: 0

## 2019-11-04 NOTE — PROGRESS NOTES
Cardiology/Electrophysiology Follow-up Note      Subjective:   Chief Complaint:   Chief Complaint   Patient presents with   • Syncope       Kalie Cook is a 58 y.o. female who presents today for follow up vasovagal syncope.     She is followed by Dr. Peres.  Past medical history also significant for orthostatic hypotension and previously negative BLAIR.    Today in follow up she states she has been feeling well and has not had any episodes of dizziness, pre-syncope, and syncope. She does report her blood pressure has been elevated as high as 159.  She denies chest pain, palpitations, dyspnea, PND, orthopnea, or lower extremity edema.      Patient endorses medication compliance and good compliance with her aggressive hydration/sodium intake.  Does not wear compression socks.      Past Medical History:   Diagnosis Date   • Asthma      Past Surgical History:   Procedure Laterality Date   • ABDOMINAL EXPLORATION     • PRIMARY C SECTION     • SINUSOTOMIES       Family History   Problem Relation Age of Onset   • Heart Disease Father      Social History     Socioeconomic History   • Marital status:      Spouse name: Not on file   • Number of children: Not on file   • Years of education: Not on file   • Highest education level: Not on file   Occupational History   • Not on file   Social Needs   • Financial resource strain: Not on file   • Food insecurity:     Worry: Not on file     Inability: Not on file   • Transportation needs:     Medical: Not on file     Non-medical: Not on file   Tobacco Use   • Smoking status: Never Smoker   • Smokeless tobacco: Never Used   Substance and Sexual Activity   • Alcohol use: Yes     Alcohol/week: 4.2 oz     Types: 7 Standard drinks or equivalent per week     Comment: 1 a night   • Drug use: No   • Sexual activity: Yes     Partners: Male   Lifestyle   • Physical activity:     Days per week: Not on file     Minutes per session: Not on file   • Stress: Not on file   Relationships   •  Social connections:     Talks on phone: Not on file     Gets together: Not on file     Attends [employer] witness service: Not on file     Active member of club or organization: Not on file     Attends meetings of clubs or organizations: Not on file     Relationship status: Not on file   • Intimate partner violence:     Fear of current or ex partner: Not on file     Emotionally abused: Not on file     Physically abused: Not on file     Forced sexual activity: Not on file   Other Topics Concern   •  Service Not Asked   • Blood Transfusions Not Asked   • Caffeine Concern No   • Occupational Exposure Not Asked   • Hobby Hazards Not Asked   • Sleep Concern Not Asked   • Stress Concern Not Asked   • Weight Concern No   • Special Diet No   • Back Care Not Asked   • Exercise Yes   • Bike Helmet Not Asked   • Seat Belt Not Asked   • Self-Exams Not Asked   Social History Narrative   • Not on file     Allergies   Allergen Reactions   • Sulfa Drugs Hives       Current Outpatient Medications   Medication Sig Dispense Refill   • escitalopram (LEXAPRO) 20 MG tablet TAKE 1 TABLET DAILY 90 Tab 0   • midodrine (PROAMATINE) 10 MG tablet Every 4 to 5 hours while awake, up to three tabs per day (Patient taking differently: 25 mg every day. Every 4 to 5 hours while awake, up to three tabs per day) 270 Tab 3   • Nutritional Supplements (ESTROVEN PO) Take  by mouth.     • Mometasone Furo-Formoterol Fum 100-5 MCG/ACT Aerosol Inhale  by mouth.     • budesonide (PULMICORT) 0.5 MG/2ML Suspension 500 mcg every day. Patient uses nebulizer solution in a mary jane pot like applicator with saline solution and performs a nasal irrigation twice a day     • montelukast (SINGULAIR) 10 MG Tab Take 10 mg by mouth every day.     • aspirin (ASA) 325 MG Tab Take 325 mg by mouth 2 Times a Day.     • Multiple Vitamin (MULTI VITAMIN DAILY PO) Take 1 tablet by mouth every day.     • Multiple Vitamins-Minerals (AIRBORNE) Chew Tab Take 2 Tabs by mouth 2 Times a Day.   "   • levothyroxine (SYNTHROID) 75 MCG Tab Take 1 Tab by mouth Every morning on an empty stomach. 90 Tab 0   • SHINGRIX 50 MCG/0.5ML Recon Susp      • Probiotic Product (PROBIOTIC DAILY PO) Take 1 Tab by mouth every day.     • TURMERIC PO Take 1 Tab by mouth every day.     • COD LIVER OIL PO Take 2 Caps by mouth every day.       No current facility-administered medications for this visit.        Review of Systems   Constitutional: Negative for chills, fever, malaise/fatigue and weight loss.   HENT: Negative for congestion, sore throat and tinnitus.    Eyes: Negative for blurred vision and double vision.   Respiratory: Negative for cough, hemoptysis, sputum production, shortness of breath and wheezing.    Cardiovascular: Negative for chest pain, palpitations, orthopnea, leg swelling and PND.   Gastrointestinal: Negative for abdominal pain, blood in stool, diarrhea, heartburn, nausea and vomiting.   Skin: Negative for rash.   Neurological: Negative for dizziness, tingling, tremors, sensory change, speech change, focal weakness, loss of consciousness and headaches.     All others systems reviewed and negative.     Objective:     /84 (BP Location: Left arm, Patient Position: Sitting, BP Cuff Size: Adult)   Pulse 70   Ht 1.702 m (5' 7\")   Wt 70.1 kg (154 lb 9.6 oz)   SpO2 96%  Body mass index is 24.21 kg/m².    Physical Exam   Constitutional: She is oriented to person, place, and time and well-developed, well-nourished, and in no distress.   HENT:   Head: Normocephalic and atraumatic.   Eyes: Pupils are equal, round, and reactive to light. Conjunctivae and EOM are normal.   Neck: Normal range of motion. Neck supple. No JVD present.   Cardiovascular: Normal rate, regular rhythm, normal heart sounds and intact distal pulses. Exam reveals no gallop and no friction rub.   No murmur heard.  Pulmonary/Chest: Effort normal and breath sounds normal. No respiratory distress. She has no wheezes. She has no rales. She " exhibits no tenderness.   Abdominal: Soft. Bowel sounds are normal. There is no tenderness.   Musculoskeletal: Normal range of motion.         General: No edema.   Neurological: She is alert and oriented to person, place, and time.   Skin: Skin is warm and dry. No rash noted. No erythema.   Psychiatric: Mood, memory, affect and judgment normal.       Cardiac Imaging and Procedures Review:    EKG dated 4/1/19:   Sinus latoya, rate 55.     Echo dated 3/1/16:   Left Ventricle  Normal left ventricular size, thickness, systolic function, and   diastolic function. Left ventricular ejection fraction is visually   estimated to be 60%. Normal regional wall motion.    Right Ventricle  The right ventricle was normal in size and function.    Right Atrium  The right atrium is normal in size. Normal inferior vena cava size and   inspiratory collapse.    Left Atrium  The left atrium is normal in size. Left atrial volume index is 18 mL/sq       Mitral Valve  Mild mitral annular calcification. Normal mitral valve leaflets without   mitral regurgitation.    Aortic Valve  Mild aortic sclerosis without stenosis. No aortic insufficiency.    Tricuspid Valve  Structurally normal tricuspid valve without significant stenosis. Trace   tricuspid regurgitation. Right atrial pressure is estimated to be 15   mmHg. Right heart pressures are normal.    Pulmonic Valve  The pulmonic valve is not well visualized. No pulmonic stenosis. Trace   pulmonic insufficiency.    Pericardium  Normal pericardium without effusion.    Aorta  The aortic root, 3.8 cm. Ascending aorta not well visualized.      Assessment:     1. Orthostatic hypotension     2. Secondary hypertension     3. Hyperlipidemia, unspecified hyperlipidemia type  LIPID PANEL       Medical Decision Making:  Today's Assessment / Status / Plan:   1. Orthostatic hypotension:  - No complaints of dizziness, pre-syncope, or syncope.   -  Adjust midodrine as below.  Continue aggressive hydration,  sodium intake.  - She will look into purchasing compression stockings.    2. Secondary hypertension  - Patient has been experiencing increased blood pressures, report up to SBP of 159. Borderline elevated today. We discussed her dosing regimen for midodrine she was taking 10mg at 0530, 5 mg at 1030, 5 mg at 1530, and 5 mg at 20:30. Recommended she discontinue taking her 2030 dose and not take a dose in the evening, secondary to dosing instructions.   - She will check her BP on her new dosing regimen and call in 10 days with her BP report.     3. Hyperlipidemia  - Her LDL was 153, HDL excellent at 99, triglycerides 61. Discussed dietary changes to reduce LDL, but HDL is optimal. Would not treat at this time besides dietary modifications. Will recheck lipid profile in 3 months.    Plan reviewed in detail with the patient and she verbalizes understanding and is in agreement.   RTC in 4-5 months, sooner if clinical condition changes  Collaborating MD/ADD: SHERRI Ware.

## 2019-11-04 NOTE — TELEPHONE ENCOUNTER
Please advise patient she is due for office visit and labs prior to further refills. Thanks! Keyla Roy P.A.-C. covering for Haily Ye M.D.

## 2019-11-04 NOTE — TELEPHONE ENCOUNTER
Patient establishing with Dr Anderson 06/2020.      Was the patient seen in the last year in this department? Yes    Does patient have an active prescription for medications requested? No     Received Request Via: Patient

## 2019-11-14 ENCOUNTER — HOSPITAL ENCOUNTER (OUTPATIENT)
Dept: RADIOLOGY | Facility: MEDICAL CENTER | Age: 59
End: 2019-11-14
Attending: FAMILY MEDICINE
Payer: COMMERCIAL

## 2019-11-14 DIAGNOSIS — Z12.31 SCREENING MAMMOGRAM, ENCOUNTER FOR: ICD-10-CM

## 2019-11-14 PROCEDURE — 77063 BREAST TOMOSYNTHESIS BI: CPT

## 2019-11-18 ENCOUNTER — TELEPHONE (OUTPATIENT)
Dept: CARDIOLOGY | Facility: MEDICAL CENTER | Age: 59
End: 2019-11-18

## 2019-11-18 NOTE — TELEPHONE ENCOUNTER
Aram Mujica,   The following is my blood pressure readings for the past 10 days:   Date                           5:30 a.m.                           7:30 pm                            Midodrine taken throughout the day   11/05                        144/96                                 149/89                                  1/2  4 x   11/06                         115/81                                144/81                                  1/2 3x   11/07                         121/87                                165/92                                  1/2 2x   11/08                          117/82                               159/97                                  1/2 1x  see next message      Kalie Cook Erica L, A.P.R.N. 2 days ago         cont. blood pressure   117/84   132/78   no meds   135/88.   137/77.   0   104/82.    117/70.   0   120/67.    105/74.   0   100/81.    135/86.   0   119/81.    139/77.   0   122/80.    131/79.   0

## 2019-11-19 NOTE — TELEPHONE ENCOUNTER
So she has weaned from midodrine completely?  Would like to make sure that I understand her note completely.    No syncope/presyncope?

## 2019-11-25 NOTE — TELEPHONE ENCOUNTER
To DONTAE Cook, Kalie  You 2 days ago         Hi,   I am off Midodrine.  I have not had any passing out or near passing out episodes.  This morning my blood pressure was 118/81.

## 2020-03-04 RX ORDER — LEVOTHYROXINE SODIUM 0.07 MG/1
75 TABLET ORAL
Qty: 90 TAB | Refills: 0 | Status: SHIPPED | OUTPATIENT
Start: 2020-03-04 | End: 2021-04-19

## 2020-06-24 ENCOUNTER — OFFICE VISIT (OUTPATIENT)
Dept: MEDICAL GROUP | Facility: MEDICAL CENTER | Age: 60
End: 2020-06-24
Payer: COMMERCIAL

## 2020-06-24 VITALS
HEART RATE: 65 BPM | TEMPERATURE: 98.6 F | SYSTOLIC BLOOD PRESSURE: 124 MMHG | DIASTOLIC BLOOD PRESSURE: 74 MMHG | OXYGEN SATURATION: 95 % | HEIGHT: 67 IN | WEIGHT: 156.64 LBS | BODY MASS INDEX: 24.58 KG/M2 | RESPIRATION RATE: 16 BRPM

## 2020-06-24 DIAGNOSIS — Z71.3 ENCOUNTER FOR WEIGHT LOSS COUNSELING: ICD-10-CM

## 2020-06-24 DIAGNOSIS — I95.1 ORTHOSTATIC HYPOTENSION: ICD-10-CM

## 2020-06-24 DIAGNOSIS — Z12.11 SCREENING FOR COLON CANCER: ICD-10-CM

## 2020-06-24 DIAGNOSIS — Z00.00 PREVENTATIVE HEALTH CARE: ICD-10-CM

## 2020-06-24 DIAGNOSIS — E03.9 HYPOTHYROIDISM, UNSPECIFIED TYPE: ICD-10-CM

## 2020-06-24 PROCEDURE — 99203 OFFICE O/P NEW LOW 30 MIN: CPT | Performed by: FAMILY MEDICINE

## 2020-06-24 SDOH — HEALTH STABILITY: MENTAL HEALTH: HOW OFTEN DO YOU HAVE A DRINK CONTAINING ALCOHOL?: 4 OR MORE TIMES A WEEK

## 2020-06-24 NOTE — ASSESSMENT & PLAN NOTE
With jail pandemic she has had steady gain   She is still normal BMI   But we have prolonged discussion regarding post menopausal specific weight loss strategy   We discuss role of medication with normal bmi vs elevated BMI + risk factor   Avoid etoh  Muscle mass   Avoid added sugar  Hormones   Over 45 minutes spent with patient face to face, greater than 50% time spent with plan/coordination of care regarding that which is discussed in the HPI and A&P

## 2020-06-24 NOTE — PROGRESS NOTES
This medical record contains text that has been entered with the assistance of computer voice recognition and dictation software.  Therefore, it may contain unintended errors in text, spelling, punctuation, or grammar        Chief Complaint   Patient presents with   • Establish Care       Kalie Cook is a 59 y.o. female here evaluation and management of: est care discuss weight management         HPI:     Recently retired teacher   Feeling well   Wants new doctor   Wants to discuss weight     Current Outpatient Medications   Medication Sig Dispense Refill   • Calcium Polycarbophil (FIBER-CAPS PO) Take 1 Tab by mouth 3 times a day.     • Cholecalciferol (VITAMIN D3 PO) Take 1 Cap by mouth every day.     • levothyroxine (SYNTHROID) 75 MCG Tab Take 1 Tab by mouth Every morning on an empty stomach. 90 Tab 0   • Nutritional Supplements (ESTROVEN PO) Take  by mouth.     • Mometasone Furo-Formoterol Fum 100-5 MCG/ACT Aerosol Inhale  by mouth.     • budesonide (PULMICORT) 0.5 MG/2ML Suspension 500 mcg every day. Patient uses nebulizer solution in a mary jane pot like applicator with saline solution and performs a nasal irrigation twice a day     • montelukast (SINGULAIR) 10 MG Tab Take 10 mg by mouth every day.     • aspirin (ASA) 325 MG Tab Take 325 mg by mouth 2 Times a Day.     • Multiple Vitamin (MULTI VITAMIN DAILY PO) Take 1 tablet by mouth every day.     • midodrine (PROAMATINE) 10 MG tablet Every 4 to 5 hours while awake, up to three tabs per day (Patient not taking: Reported on 6/24/2020) 270 Tab 3     No current facility-administered medications for this visit.      Patient Active Problem List    Diagnosis Date Noted   • Encounter for weight loss counseling 06/24/2020   • Mild persistent asthma without complication 01/04/2018   • Orthostatic hypotension 05/19/2016   • Hypothyroidism (acquired) 04/25/2016   • Depression 04/25/2016     Past Surgical History:   Procedure Laterality Date   • ABDOMINAL EXPLORATION     •  "PRIMARY C SECTION     • SINUSOTOMIES        Social History     Tobacco Use   • Smoking status: Never Smoker   • Smokeless tobacco: Never Used   Substance Use Topics   • Alcohol use: Yes     Alcohol/week: 4.2 oz     Types: 7 Standard drinks or equivalent per week     Frequency: 4 or more times a week     Comment: 1 a night beer or mixed drink   • Drug use: No     Family History   Problem Relation Age of Onset   • Heart Disease Father            ROS    all review of system completed and negative except for those listed above     Objective:     /74 (BP Location: Left arm, Patient Position: Sitting, BP Cuff Size: Adult)   Pulse 65   Temp 37 °C (98.6 °F) (Temporal)   Resp 16   Ht 1.702 m (5' 7\")   Wt 71.1 kg (156 lb 10.2 oz)   SpO2 95%  Body mass index is 24.53 kg/m².  Physical Exam:    Constitutional: Alert, no distress.  Skin: Warm, dry, good turgor, no rashes in visible areas.  Eye: Equal, round and reactive, conjunctiva clear, lids normal.  ENMT: Lips without lesions, good dentition, oropharynx clear.  Neck: Trachea midline, no masses, no thyromegaly. No cervical or supraclavicular lymphadenopathy.  Respiratory: Unlabored respiratory effort, lungs clear to auscultation, no wheezes, no ronchi.  Cardiovascular: Normal S1, S2, no murmur, no edema.  Abdomen: Soft, non-tender, no masses, no hepatosplenomegaly.  Psych: Alert and oriented x3, normal affect and mood.          Assessment and Plan:   The following treatment plan was discussed        Problem List Items Addressed This Visit     Orthostatic hypotension    Encounter for weight loss counseling     With correction pandemic she has had steady gain   She is still normal BMI   But we have prolonged discussion regarding post menopausal specific weight loss strategy   We discuss role of medication with normal bmi vs elevated BMI + risk factor   Avoid etoh  Muscle mass   Avoid added sugar  Hormones   30 min spent with pt during which > 50 % spent face to face  " coordinating care             Other Visit Diagnoses     Screening for colon cancer        Relevant Orders    REFERRAL TO GI FOR COLONOSCOPY    Preventative health care        Relevant Orders    Basic Metabolic Panel    Lipid Profile    HEP C VIRUS ANTIBODY    Hypothyroidism, unspecified type        Relevant Orders    TSH                Instructed to follow up if symptoms worsen or fail to improve, ER/UC precautions discussed as well    Madeline Anderson MD  [facility], [address] [place]   [place] [id number]  Phone: [phone]

## 2020-07-06 ENCOUNTER — HOSPITAL ENCOUNTER (OUTPATIENT)
Dept: LAB | Facility: MEDICAL CENTER | Age: 60
End: 2020-07-06
Attending: FAMILY MEDICINE
Payer: COMMERCIAL

## 2020-07-06 DIAGNOSIS — E03.9 HYPOTHYROIDISM, UNSPECIFIED TYPE: ICD-10-CM

## 2020-07-06 DIAGNOSIS — Z00.00 PREVENTATIVE HEALTH CARE: ICD-10-CM

## 2020-07-06 LAB
ANION GAP SERPL CALC-SCNC: 11 MMOL/L (ref 7–16)
BUN SERPL-MCNC: 13 MG/DL (ref 8–22)
CALCIUM SERPL-MCNC: 9.9 MG/DL (ref 8.5–10.5)
CHLORIDE SERPL-SCNC: 102 MMOL/L (ref 96–112)
CHOLEST SERPL-MCNC: 229 MG/DL (ref 100–199)
CO2 SERPL-SCNC: 26 MMOL/L (ref 20–33)
CREAT SERPL-MCNC: 0.79 MG/DL (ref 0.5–1.4)
FASTING STATUS PATIENT QL REPORTED: NORMAL
GLUCOSE SERPL-MCNC: 91 MG/DL (ref 65–99)
HCV AB SER QL: NORMAL
HDLC SERPL-MCNC: 88 MG/DL
LDLC SERPL CALC-MCNC: 127 MG/DL
POTASSIUM SERPL-SCNC: 5.1 MMOL/L (ref 3.6–5.5)
SODIUM SERPL-SCNC: 139 MMOL/L (ref 135–145)
TRIGL SERPL-MCNC: 72 MG/DL (ref 0–149)
TSH SERPL DL<=0.005 MIU/L-ACNC: 4.05 UIU/ML (ref 0.38–5.33)

## 2020-07-06 PROCEDURE — 84443 ASSAY THYROID STIM HORMONE: CPT

## 2020-07-06 PROCEDURE — 86803 HEPATITIS C AB TEST: CPT

## 2020-07-06 PROCEDURE — 80048 BASIC METABOLIC PNL TOTAL CA: CPT

## 2020-07-06 PROCEDURE — 36415 COLL VENOUS BLD VENIPUNCTURE: CPT

## 2020-07-06 PROCEDURE — 80061 LIPID PANEL: CPT

## 2020-10-23 ENCOUNTER — OFFICE VISIT (OUTPATIENT)
Dept: URGENT CARE | Facility: CLINIC | Age: 60
End: 2020-10-23
Payer: COMMERCIAL

## 2020-10-23 ENCOUNTER — HOSPITAL ENCOUNTER (OUTPATIENT)
Facility: MEDICAL CENTER | Age: 60
End: 2020-10-23
Attending: NURSE PRACTITIONER
Payer: COMMERCIAL

## 2020-10-23 VITALS
HEART RATE: 77 BPM | BODY MASS INDEX: 23.89 KG/M2 | DIASTOLIC BLOOD PRESSURE: 70 MMHG | TEMPERATURE: 97.6 F | HEIGHT: 67 IN | OXYGEN SATURATION: 100 % | SYSTOLIC BLOOD PRESSURE: 116 MMHG | RESPIRATION RATE: 16 BRPM | WEIGHT: 152.2 LBS

## 2020-10-23 DIAGNOSIS — R35.0 URINARY FREQUENCY: ICD-10-CM

## 2020-10-23 DIAGNOSIS — R31.9 URINARY TRACT INFECTION WITH HEMATURIA, SITE UNSPECIFIED: ICD-10-CM

## 2020-10-23 DIAGNOSIS — N39.0 URINARY TRACT INFECTION WITH HEMATURIA, SITE UNSPECIFIED: ICD-10-CM

## 2020-10-23 LAB
APPEARANCE UR: CLEAR
BILIRUB UR STRIP-MCNC: NEGATIVE MG/DL
COLOR UR AUTO: NORMAL
GLUCOSE UR STRIP.AUTO-MCNC: NEGATIVE MG/DL
KETONES UR STRIP.AUTO-MCNC: NEGATIVE MG/DL
LEUKOCYTE ESTERASE UR QL STRIP.AUTO: NORMAL
NITRITE UR QL STRIP.AUTO: NEGATIVE
PH UR STRIP.AUTO: 7 [PH] (ref 5–8)
PROT UR QL STRIP: 100 MG/DL
RBC UR QL AUTO: NORMAL
SP GR UR STRIP.AUTO: 1.01
UROBILINOGEN UR STRIP-MCNC: 0.2 MG/DL

## 2020-10-23 PROCEDURE — 99213 OFFICE O/P EST LOW 20 MIN: CPT | Performed by: NURSE PRACTITIONER

## 2020-10-23 PROCEDURE — 81002 URINALYSIS NONAUTO W/O SCOPE: CPT | Performed by: NURSE PRACTITIONER

## 2020-10-23 PROCEDURE — 87086 URINE CULTURE/COLONY COUNT: CPT

## 2020-10-23 RX ORDER — NITROFURANTOIN 25; 75 MG/1; MG/1
100 CAPSULE ORAL 2 TIMES DAILY
Qty: 10 CAP | Refills: 0 | Status: SHIPPED | OUTPATIENT
Start: 2020-10-23 | End: 2020-10-28

## 2020-10-24 DIAGNOSIS — R31.9 URINARY TRACT INFECTION WITH HEMATURIA, SITE UNSPECIFIED: ICD-10-CM

## 2020-10-24 DIAGNOSIS — N39.0 URINARY TRACT INFECTION WITH HEMATURIA, SITE UNSPECIFIED: ICD-10-CM

## 2020-10-24 ASSESSMENT — ENCOUNTER SYMPTOMS
CHILLS: 0
NAUSEA: 0
FLANK PAIN: 0

## 2020-10-24 NOTE — PROGRESS NOTES
Subjective:      Kalie Cook is a 59 y.o. female who presents with Urinary Frequency (couple of days)            Dysuria   The current episode started in the past 7 days. The problem occurs every urination. The problem has been gradually worsening. The quality of the pain is described as burning. There has been no fever. Associated symptoms include frequency. Pertinent negatives include no chills, discharge, flank pain, hematuria, hesitancy, nausea or urgency. She has tried nothing for the symptoms. The treatment provided mild relief.     Sulfa drugs  Current Outpatient Medications on File Prior to Visit   Medication Sig Dispense Refill   • Calcium Polycarbophil (FIBER-CAPS PO) Take 1 Tab by mouth 3 times a day.     • Cholecalciferol (VITAMIN D3 PO) Take 1 Cap by mouth every day.     • levothyroxine (SYNTHROID) 75 MCG Tab Take 1 Tab by mouth Every morning on an empty stomach. 90 Tab 0   • Nutritional Supplements (ESTROVEN PO) Take  by mouth.     • Mometasone Furo-Formoterol Fum 100-5 MCG/ACT Aerosol Inhale  by mouth.     • montelukast (SINGULAIR) 10 MG Tab Take 10 mg by mouth every day.     • Multiple Vitamin (MULTI VITAMIN DAILY PO) Take 1 tablet by mouth every day.     • midodrine (PROAMATINE) 10 MG tablet Every 4 to 5 hours while awake, up to three tabs per day (Patient not taking: Reported on 6/24/2020) 270 Tab 3   • budesonide (PULMICORT) 0.5 MG/2ML Suspension 500 mcg every day. Patient uses nebulizer solution in a mary jane pot like applicator with saline solution and performs a nasal irrigation twice a day     • aspirin (ASA) 325 MG Tab Take 325 mg by mouth 2 Times a Day.       No current facility-administered medications on file prior to visit.      Social History     Socioeconomic History   • Marital status:      Spouse name: Not on file   • Number of children: Not on file   • Years of education: Not on file   • Highest education level: Not on file   Occupational History   • Not on file   Social Needs    • Financial resource strain: Not on file   • Food insecurity     Worry: Not on file     Inability: Not on file   • Transportation needs     Medical: Not on file     Non-medical: Not on file   Tobacco Use   • Smoking status: Never Smoker   • Smokeless tobacco: Never Used   Substance and Sexual Activity   • Alcohol use: Yes     Alcohol/week: 4.2 oz     Types: 7 Standard drinks or equivalent per week     Frequency: 4 or more times a week     Comment: 1 a night beer or mixed drink   • Drug use: No   • Sexual activity: Yes     Partners: Male   Lifestyle   • Physical activity     Days per week: Not on file     Minutes per session: Not on file   • Stress: Not on file   Relationships   • Social connections     Talks on phone: Not on file     Gets together: Not on file     Attends Restorationism service: Not on file     Active member of club or organization: Not on file     Attends meetings of clubs or organizations: Not on file     Relationship status: Not on file   • Intimate partner violence     Fear of current or ex partner: Not on file     Emotionally abused: Not on file     Physically abused: Not on file     Forced sexual activity: Not on file   Other Topics Concern   •  Service Not Asked   • Blood Transfusions Not Asked   • Caffeine Concern No   • Occupational Exposure Not Asked   • Hobby Hazards Not Asked   • Sleep Concern Not Asked   • Stress Concern Not Asked   • Weight Concern No   • Special Diet No   • Back Care Not Asked   • Exercise Yes   • Bike Helmet Not Asked   • Seat Belt Not Asked   • Self-Exams Not Asked   Social History Narrative   • Not on file     Breast Cancer-related family history is not on file.    Review of Systems   Constitutional: Negative for chills.   Gastrointestinal: Negative for nausea.   Genitourinary: Positive for dysuria and frequency. Negative for flank pain, hematuria, hesitancy and urgency.          Objective:     /70 (BP Location: Left arm, Patient Position: Sitting, BP  "Cuff Size: Adult)   Pulse 77   Temp 36.4 °C (97.6 °F)   Resp 16   Ht 1.702 m (5' 7\")   Wt 69 kg (152 lb 3.2 oz)   LMP 01/15/2016   SpO2 100%   BMI 23.84 kg/m²      Physical Exam  Vitals signs reviewed.   Constitutional:       General: She is not in acute distress.     Appearance: Normal appearance. She is well-developed.   HENT:      Head: Normocephalic and atraumatic.   Neck:      Musculoskeletal: Normal range of motion and neck supple.   Cardiovascular:      Rate and Rhythm: Normal rate and regular rhythm.      Heart sounds: Normal heart sounds. No murmur.   Pulmonary:      Effort: Pulmonary effort is normal. No respiratory distress.      Breath sounds: Normal breath sounds.   Abdominal:      General: Abdomen is flat.      Palpations: Abdomen is soft.      Tenderness: There is no abdominal tenderness.   Musculoskeletal: Normal range of motion.      Comments: Normal movement of all 4 extremities.   Lymphadenopathy:      Cervical: No cervical adenopathy.      Upper Body:      Right upper body: No supraclavicular adenopathy.      Left upper body: No supraclavicular adenopathy.   Skin:     General: Skin is warm and dry.   Neurological:      Mental Status: She is alert and oriented to person, place, and time.      Gait: Gait normal.   Psychiatric:         Behavior: Behavior normal.         Thought Content: Thought content normal.                 Assessment/Plan:        1. Urinary tract infection with hematuria, site unspecified  nitrofurantoin (MACROBID) 100 MG Cap    URINE CULTURE(NEW)   2. Urinary frequency  POCT Urinalysis     Differential diagnosis, natural history, supportive care, and indications for immediate follow-up discussed at length.     "

## 2020-10-26 LAB
BACTERIA UR CULT: NORMAL
SIGNIFICANT IND 70042: NORMAL
SITE SITE: NORMAL
SOURCE SOURCE: NORMAL

## 2020-11-18 ENCOUNTER — HOSPITAL ENCOUNTER (OUTPATIENT)
Facility: MEDICAL CENTER | Age: 60
End: 2020-11-18
Attending: PHYSICIAN ASSISTANT
Payer: COMMERCIAL

## 2020-11-18 ENCOUNTER — OFFICE VISIT (OUTPATIENT)
Dept: URGENT CARE | Facility: CLINIC | Age: 60
End: 2020-11-18
Payer: COMMERCIAL

## 2020-11-18 VITALS
TEMPERATURE: 96.6 F | WEIGHT: 152 LBS | SYSTOLIC BLOOD PRESSURE: 118 MMHG | RESPIRATION RATE: 18 BRPM | OXYGEN SATURATION: 95 % | HEIGHT: 67 IN | BODY MASS INDEX: 23.86 KG/M2 | HEART RATE: 61 BPM | DIASTOLIC BLOOD PRESSURE: 82 MMHG

## 2020-11-18 DIAGNOSIS — R35.0 URINARY FREQUENCY: ICD-10-CM

## 2020-11-18 DIAGNOSIS — J01.10 ACUTE NON-RECURRENT FRONTAL SINUSITIS: ICD-10-CM

## 2020-11-18 LAB
APPEARANCE UR: CLEAR
BILIRUB UR STRIP-MCNC: NEGATIVE MG/DL
COLOR UR AUTO: YELLOW
GLUCOSE UR STRIP.AUTO-MCNC: NEGATIVE MG/DL
KETONES UR STRIP.AUTO-MCNC: NEGATIVE MG/DL
LEUKOCYTE ESTERASE UR QL STRIP.AUTO: NEGATIVE
NITRITE UR QL STRIP.AUTO: NEGATIVE
PH UR STRIP.AUTO: 7 [PH] (ref 5–8)
PROT UR QL STRIP: NEGATIVE MG/DL
RBC UR QL AUTO: NEGATIVE
SP GR UR STRIP.AUTO: 1.01
UROBILINOGEN UR STRIP-MCNC: 0.2 MG/DL

## 2020-11-18 PROCEDURE — 81002 URINALYSIS NONAUTO W/O SCOPE: CPT | Performed by: PHYSICIAN ASSISTANT

## 2020-11-18 PROCEDURE — 87086 URINE CULTURE/COLONY COUNT: CPT

## 2020-11-18 PROCEDURE — 99214 OFFICE O/P EST MOD 30 MIN: CPT | Performed by: PHYSICIAN ASSISTANT

## 2020-11-18 RX ORDER — AMOXICILLIN AND CLAVULANATE POTASSIUM 875; 125 MG/1; MG/1
1 TABLET, FILM COATED ORAL 2 TIMES DAILY
Qty: 14 TAB | Refills: 0 | Status: SHIPPED | OUTPATIENT
Start: 2020-11-18 | End: 2020-11-25

## 2020-11-18 ASSESSMENT — ENCOUNTER SYMPTOMS
ABDOMINAL PAIN: 0
SHORTNESS OF BREATH: 0
FLANK PAIN: 0
EYE REDNESS: 0
VERTIGO: 0
DIZZINESS: 0
SENSORY CHANGE: 0
NECK PAIN: 0
TINGLING: 0
NAUSEA: 0
MYALGIAS: 0
EYE PAIN: 0
SORE THROAT: 0
DIAPHORESIS: 0
SINUS PAIN: 1
EYE DISCHARGE: 0
COUGH: 0
FEVER: 0
CHILLS: 0
HEADACHES: 1
FATIGUE: 0
VOMITING: 0

## 2020-11-18 NOTE — PROGRESS NOTES
Subjective:   Kalie Cook is a 59 y.o. female who presents for Sinus Pain (x7-10 days, sinus pain, ear pain, headache above eyes and forehead) and Urinary Frequency (urinary frequency not going)      Sinus Pain  This is a new problem. Episode onset: 10 days.  Bilateral frontal sinus pain and pressure.  History of recurrent sinus infections.  Has previously had surgery on her sinuses due to repeat infections. The problem occurs constantly. The problem has been gradually worsening. Associated symptoms include congestion and headaches. Pertinent negatives include no abdominal pain, chest pain, chills, coughing, diaphoresis, fatigue, fever, myalgias, nausea, neck pain, sore throat, vertigo or vomiting. Nothing aggravates the symptoms. Treatments tried: Nasal rinses with budesonide and saline. The treatment provided mild relief.   Urinary Frequency  This is a recurrent problem. The current episode started 1 to 4 weeks ago (Patient was seen and treated in clinic last month for UTI.  She completed her Macrobid as directed.  States her dysuria and lower abdominal discomfort have resolved.  Currently just has urinary frequency.). The problem has been rapidly improving. Associated symptoms include congestion and headaches. Pertinent negatives include no abdominal pain, chest pain, chills, coughing, diaphoresis, fatigue, fever, myalgias, nausea, neck pain, sore throat, vertigo or vomiting. Nothing aggravates the symptoms. She has tried nothing for the symptoms.       Review of Systems   Constitutional: Negative for chills, diaphoresis, fatigue, fever and malaise/fatigue.   HENT: Positive for congestion and sinus pain. Negative for ear pain (Fullness sensation to the bilateral ears.), sore throat and tinnitus.    Eyes: Negative for pain, discharge and redness.   Respiratory: Negative for cough and shortness of breath.    Cardiovascular: Negative for chest pain.   Gastrointestinal: Negative for abdominal pain, nausea and  "vomiting.   Genitourinary: Positive for frequency. Negative for dysuria, flank pain, hematuria and urgency.   Musculoskeletal: Negative for myalgias and neck pain.   Neurological: Positive for headaches. Negative for dizziness, vertigo, tingling and sensory change.       Medications:    • amoxicillin-clavulanate Tabs  • aspirin Tabs  • budesonide Susp  • ESTROVEN PO  • FIBER-CAPS PO  • levothyroxine Tabs  • midodrine  • Mometasone Furo-Formoterol Fum Aero  • montelukast Tabs  • MULTI VITAMIN DAILY PO  • VITAMIN D3 PO    Allergies: Sulfa drugs    Problem List: Kalie Cook has Hypothyroidism (acquired); Depression; Orthostatic hypotension; Mild persistent asthma without complication; and Encounter for weight loss counseling on their problem list.    Surgical History:  Past Surgical History:   Procedure Laterality Date   • ABDOMINAL EXPLORATION     • PRIMARY C SECTION     • SINUSOTOMIES         Past Social Hx: Kalie Cook  reports that she has never smoked. She has never used smokeless tobacco. She reports current alcohol use of about 4.2 oz of alcohol per week. She reports that she does not use drugs.     Past Family Hx:  Kalie Cook family history includes Heart Disease in her father.     Problem list, medications, and allergies reviewed by myself today in Epic.     Objective:     /82 (BP Location: Left arm, Patient Position: Sitting, BP Cuff Size: Adult)   Pulse 61   Temp 35.9 °C (96.6 °F) (Temporal)   Resp 18   Ht 1.702 m (5' 7\")   Wt 68.9 kg (152 lb)   LMP 01/15/2016   SpO2 95%   BMI 23.81 kg/m²     Physical Exam  Constitutional:       General: She is not in acute distress.     Appearance: Normal appearance. She is not ill-appearing, toxic-appearing or diaphoretic.   HENT:      Head: Normocephalic and atraumatic.      Comments: Mild tenderness to palpation over the bilateral frontal sinuses.     Right Ear: Ear canal and external ear normal.      Left Ear: Ear canal and external ear normal.      " Ears:      Comments: Small bilateral middle ear effusions     Nose: Congestion present. No rhinorrhea.      Mouth/Throat:      Mouth: Mucous membranes are moist.      Pharynx: Posterior oropharyngeal erythema present. No oropharyngeal exudate.      Comments: Postnasal drainage appreciated.  Eyes:      Extraocular Movements: Extraocular movements intact.      Conjunctiva/sclera: Conjunctivae normal.      Pupils: Pupils are equal, round, and reactive to light.   Neck:      Musculoskeletal: Normal range of motion. No neck rigidity or muscular tenderness.   Cardiovascular:      Rate and Rhythm: Normal rate and regular rhythm.      Pulses: Normal pulses.      Heart sounds: Normal heart sounds.   Pulmonary:      Effort: Pulmonary effort is normal.      Breath sounds: Normal breath sounds. No wheezing.   Abdominal:      General: Bowel sounds are normal.      Palpations: Abdomen is soft.      Tenderness: There is no abdominal tenderness. There is no right CVA tenderness, left CVA tenderness or guarding.   Lymphadenopathy:      Cervical: No cervical adenopathy.   Skin:     General: Skin is warm and dry.      Capillary Refill: Capillary refill takes less than 2 seconds.   Neurological:      General: No focal deficit present.      Mental Status: She is alert. Mental status is at baseline.   Psychiatric:         Mood and Affect: Mood normal.         Thought Content: Thought content normal.       Urine analysis: Within normal limits.  Urine culture: Pending    Assessment/Plan:     Diagnosis and associated orders:     1. Acute non-recurrent frontal sinusitis  amoxicillin-clavulanate (AUGMENTIN) 875-125 MG Tab   2. Urinary frequency  POCT Urinalysis    URINE CULTURE(NEW)      Comments/MDM:       Continue nasal rinses with budesonide as directed by ENT.  Take oral antibiotic as directed.  Take with food.  Drink plenty of fluids.  Ibuprofen or Tylenol as directed for pain.   Warm compress to sinuses.   Urine culture sent.  We will  follow-up once results are available.    • Follow up with primary care provider. Urgently for worsening symptoms, persistent fevers, facial swelling, visual changes, weakness, elevated heart rate, stiff neck, symptoms last longer than 10 days, or any other concerns.           Differential diagnosis, natural history, supportive care, and indications for immediate follow-up discussed.    Advised the patient to follow-up with the primary care physician for recheck, reevaluation, and consideration of further management.    Please note that this dictation was created using voice recognition software. I have made reasonable attempt to correct obvious errors, but I expect that there are errors of grammar and possibly content that I did not discover before finalizing the note.    This note was electronically signed by HELENA Johnston PA-C

## 2020-11-19 DIAGNOSIS — R35.0 URINARY FREQUENCY: ICD-10-CM

## 2020-11-21 LAB
BACTERIA UR CULT: NORMAL
SIGNIFICANT IND 70042: NORMAL
SITE SITE: NORMAL
SOURCE SOURCE: NORMAL

## 2021-03-15 DIAGNOSIS — Z23 NEED FOR VACCINATION: ICD-10-CM

## 2021-04-19 RX ORDER — LEVOTHYROXINE SODIUM 75 MCG
TABLET ORAL
Qty: 90 TABLET | Refills: 0 | Status: SHIPPED | OUTPATIENT
Start: 2021-04-19 | End: 2021-07-21 | Stop reason: SDUPTHER

## 2021-07-21 ENCOUNTER — HOSPITAL ENCOUNTER (OUTPATIENT)
Facility: MEDICAL CENTER | Age: 61
End: 2021-07-21
Attending: FAMILY MEDICINE
Payer: COMMERCIAL

## 2021-07-21 ENCOUNTER — OFFICE VISIT (OUTPATIENT)
Dept: MEDICAL GROUP | Facility: MEDICAL CENTER | Age: 61
End: 2021-07-21
Payer: COMMERCIAL

## 2021-07-21 VITALS
HEIGHT: 68 IN | HEART RATE: 66 BPM | WEIGHT: 143.3 LBS | BODY MASS INDEX: 21.72 KG/M2 | DIASTOLIC BLOOD PRESSURE: 72 MMHG | RESPIRATION RATE: 16 BRPM | OXYGEN SATURATION: 98 % | SYSTOLIC BLOOD PRESSURE: 122 MMHG | TEMPERATURE: 96.8 F

## 2021-07-21 DIAGNOSIS — N39.41 URGE INCONTINENCE: ICD-10-CM

## 2021-07-21 DIAGNOSIS — Z12.11 SCREENING FOR COLON CANCER: ICD-10-CM

## 2021-07-21 DIAGNOSIS — Z12.31 ENCOUNTER FOR SCREENING MAMMOGRAM FOR BREAST CANCER: ICD-10-CM

## 2021-07-21 DIAGNOSIS — Z13.6 SCREENING FOR ISCHEMIC HEART DISEASE: ICD-10-CM

## 2021-07-21 DIAGNOSIS — E03.9 HYPOTHYROIDISM, UNSPECIFIED TYPE: ICD-10-CM

## 2021-07-21 DIAGNOSIS — Z13.1 SCREENING FOR DIABETES MELLITUS (DM): ICD-10-CM

## 2021-07-21 DIAGNOSIS — Z01.419 WELL WOMAN EXAM WITH ROUTINE GYNECOLOGICAL EXAM: ICD-10-CM

## 2021-07-21 PROCEDURE — 87624 HPV HI-RISK TYP POOLED RSLT: CPT

## 2021-07-21 PROCEDURE — 88175 CYTOPATH C/V AUTO FLUID REDO: CPT

## 2021-07-21 PROCEDURE — 99396 PREV VISIT EST AGE 40-64: CPT | Performed by: FAMILY MEDICINE

## 2021-07-21 RX ORDER — LEVOTHYROXINE SODIUM 0.07 MG/1
TABLET ORAL
Qty: 90 TABLET | Refills: 3 | Status: SHIPPED | OUTPATIENT
Start: 2021-07-21 | End: 2021-07-22

## 2021-07-21 ASSESSMENT — PATIENT HEALTH QUESTIONNAIRE - PHQ9
8. MOVING OR SPEAKING SO SLOWLY THAT OTHER PEOPLE COULD HAVE NOTICED. OR THE OPPOSITE, BEING SO FIGETY OR RESTLESS THAT YOU HAVE BEEN MOVING AROUND A LOT MORE THAN USUAL: NOT AT ALL
SUM OF ALL RESPONSES TO PHQ9 QUESTIONS 1 AND 2: 2
9. THOUGHTS THAT YOU WOULD BE BETTER OFF DEAD, OR OF HURTING YOURSELF: SEVERAL DAYS
2. FEELING DOWN, DEPRESSED, IRRITABLE, OR HOPELESS: SEVERAL DAYS
7. TROUBLE CONCENTRATING ON THINGS, SUCH AS READING THE NEWSPAPER OR WATCHING TELEVISION: SEVERAL DAYS
6. FEELING BAD ABOUT YOURSELF - OR THAT YOU ARE A FAILURE OR HAVE LET YOURSELF OR YOUR FAMILY DOWN: NOT AL ALL
CLINICAL INTERPRETATION OF PHQ2 SCORE: 2
5. POOR APPETITE OR OVEREATING: NOT AT ALL
4. FEELING TIRED OR HAVING LITTLE ENERGY: SEVERAL DAYS
SUM OF ALL RESPONSES TO PHQ QUESTIONS 1-9: 6
1. LITTLE INTEREST OR PLEASURE IN DOING THINGS: SEVERAL DAYS
3. TROUBLE FALLING OR STAYING ASLEEP OR SLEEPING TOO MUCH: SEVERAL DAYS
5. POOR APPETITE OR OVEREATING: 0 - NOT AT ALL
SUM OF ALL RESPONSES TO PHQ QUESTIONS 1-9: 5

## 2021-07-21 NOTE — PROGRESS NOTES
This medical record contains text that has been entered with the assistance of computer voice recognition and dictation software.  Therefore, it may contain unintended errors in text, spelling, punctuation, or grammar        Chief Complaint   Patient presents with   • Annual Exam     PAP       Kalie Cook is a 60 y.o. female here evaluation and management of:     Well woman and pap   Retired teacher   Feels well in her usual state of health     Current Outpatient Medications   Medication Sig Dispense Refill   • Mirabegron ER 50 MG TABLET SR 24 HR Take 1 tablet by mouth every day. 90 tablet 3   • levothyroxine (SYNTHROID) 75 MCG Tab TAKE 1 TABLET EVERY MORNINGON AN EMPTY STOMACH 90 tablet 3   • Calcium Polycarbophil (FIBER-CAPS PO) Take 1 Tab by mouth 3 times a day.     • Cholecalciferol (VITAMIN D3 PO) Take 1 Cap by mouth every day.     • Nutritional Supplements (ESTROVEN PO) Take  by mouth.     • Mometasone Furo-Formoterol Fum 100-5 MCG/ACT Aerosol Inhale.     • budesonide (PULMICORT) 0.5 MG/2ML Suspension 500 mcg every day. Patient uses nebulizer solution in a mary jane pot like applicator with saline solution and performs a nasal irrigation twice a day     • montelukast (SINGULAIR) 10 MG Tab Take 10 mg by mouth every day.     • aspirin (ASA) 325 MG Tab Take 325 mg by mouth 2 Times a Day.     • Multiple Vitamin (MULTI VITAMIN DAILY PO) Take 1 tablet by mouth every day.       No current facility-administered medications for this visit.     Patient Active Problem List    Diagnosis Date Noted   • Well woman exam with routine gynecological exam 07/21/2021   • Urge incontinence 07/21/2021   • Encounter for weight loss counseling 06/24/2020   • Mild persistent asthma without complication 01/04/2018   • Orthostatic hypotension 05/19/2016   • Hypothyroidism (acquired) 04/25/2016   • Depression 04/25/2016     Past Surgical History:   Procedure Laterality Date   • ABDOMINAL EXPLORATION     • PRIMARY C SECTION     •  "SINUSOTOMIES        Social History     Tobacco Use   • Smoking status: Never Smoker   • Smokeless tobacco: Never Used   Vaping Use   • Vaping Use: Never used   Substance Use Topics   • Alcohol use: Yes     Comment: few times a week, usually beer   • Drug use: No     Family History   Problem Relation Age of Onset   • Heart Disease Father            ROS    all review of system completed and negative except for those listed above     Objective:     /72 (BP Location: Left arm, Patient Position: Sitting, BP Cuff Size: Adult)   Pulse 66   Temp 36 °C (96.8 °F) (Temporal)   Resp 16   Ht 1.715 m (5' 7.5\")   Wt 65 kg (143 lb 4.8 oz)   SpO2 98%  Body mass index is 22.11 kg/m².  Physical Exam:    Constitutional: Alert, no distress.  Skin: Warm, dry, good turgor, no rashes in visible areas.  Eye: Equal, round and reactive, conjunctiva clear, lids normal.  ENMT: Lips without lesions, good dentition, oropharynx clear.  Neck: Trachea midline, no masses, no thyromegaly. No cervical or supraclavicular lymphadenopathy.  Respiratory: Unlabored respiratory effort, lungs clear to auscultation, no wheezes, no ronchi.  Cardiovascular: Normal S1, S2, no murmur, no edema.  Abdomen: Soft, non-tender, no masses, no hepatosplenomegaly.  Psych: Alert and oriented x3, normal affect and mood.          Assessment and Plan:   The following treatment plan was discussed        Problem List Items Addressed This Visit     Well woman exam with routine gynecological exam     Reviewed previous pap smears    had HPV > 2 years ago so would be acceptable/reasonable to do pap today, if today's is normal my recommendation would be routine follow up only       Age appropriate prev health care discussed   Cancer screening discussed   Vaccines discussed   Labs offered   Blood pressure at goal     Anticipatory guidance including SPF and other skin protective measures, dental hygiene and care, regular exercise, diet, stress and family planning advice " discussed.             Relevant Orders    REFERRAL TO GI FOR COLONOSCOPY    THINPREP PAP WITH HPV    MA-SCREENING MAMMO BILAT W/CAD    Basic Metabolic Panel    Lipid Profile    TSH    Urge incontinence     States this is more problematic during travel (when restrooms are not necessarily as accessible )     I suggest trial of myrbetriq              Relevant Medications    Mirabegron ER 50 MG TABLET SR 24 HR      Other Visit Diagnoses     Encounter for screening mammogram for breast cancer        Relevant Orders    REFERRAL TO GI FOR COLONOSCOPY    THINPREP PAP WITH HPV    MA-SCREENING MAMMO BILAT W/CAD    Basic Metabolic Panel    Lipid Profile    TSH    Screening for diabetes mellitus (DM)        Relevant Orders    REFERRAL TO GI FOR COLONOSCOPY    THINPREP PAP WITH HPV    MA-SCREENING MAMMO BILAT W/CAD    Basic Metabolic Panel    Lipid Profile    TSH    Screening for ischemic heart disease        Relevant Orders    REFERRAL TO GI FOR COLONOSCOPY    THINPREP PAP WITH HPV    MA-SCREENING MAMMO BILAT W/CAD    Basic Metabolic Panel    Lipid Profile    TSH    Screening for colon cancer        Relevant Orders    REFERRAL TO GI FOR COLONOSCOPY    THINPREP PAP WITH HPV    MA-SCREENING MAMMO BILAT W/CAD    Basic Metabolic Panel    Lipid Profile    TSH    Hypothyroidism, unspecified type        Relevant Medications    levothyroxine (SYNTHROID) 75 MCG Tab                Instructed to follow up if symptoms worsen or fail to improve, ER/UC precautions discussed as well    MD Juno Echav[facility]riaR[facility], Family Medicine   [address] [place] [address] [address]kw[address]   [place] [id number]  Phone: [phone]

## 2021-07-21 NOTE — ASSESSMENT & PLAN NOTE
States this is more problematic during travel (when restrooms are not necessarily as accessible )     I suggest trial of myrbetriq

## 2021-07-21 NOTE — ASSESSMENT & PLAN NOTE
Reviewed previous pap smears    had HPV > 2 years ago so would be acceptable/reasonable to do pap today, if today's is normal my recommendation would be routine follow up only       Age appropriate prev health care discussed   Cancer screening discussed   Vaccines discussed   Labs offered   Blood pressure at goal     Anticipatory guidance including SPF and other skin protective measures, dental hygiene and care, regular exercise, diet, stress and family planning advice discussed.

## 2021-07-22 DIAGNOSIS — Z01.419 WELL WOMAN EXAM WITH ROUTINE GYNECOLOGICAL EXAM: ICD-10-CM

## 2021-07-22 DIAGNOSIS — Z12.31 ENCOUNTER FOR SCREENING MAMMOGRAM FOR BREAST CANCER: ICD-10-CM

## 2021-07-22 DIAGNOSIS — Z13.1 SCREENING FOR DIABETES MELLITUS (DM): ICD-10-CM

## 2021-07-22 DIAGNOSIS — Z13.6 SCREENING FOR ISCHEMIC HEART DISEASE: ICD-10-CM

## 2021-07-22 DIAGNOSIS — Z12.11 SCREENING FOR COLON CANCER: ICD-10-CM

## 2021-07-22 RX ORDER — LEVOTHYROXINE SODIUM 0.07 MG/1
TABLET ORAL
Qty: 90 TABLET | Refills: 0 | Status: SHIPPED | OUTPATIENT
Start: 2021-07-22 | End: 2021-10-27

## 2021-07-26 ENCOUNTER — OFFICE VISIT (OUTPATIENT)
Dept: CARDIOLOGY | Facility: MEDICAL CENTER | Age: 61
End: 2021-07-26
Payer: COMMERCIAL

## 2021-07-26 VITALS
HEIGHT: 68 IN | SYSTOLIC BLOOD PRESSURE: 116 MMHG | BODY MASS INDEX: 21.67 KG/M2 | DIASTOLIC BLOOD PRESSURE: 68 MMHG | WEIGHT: 143 LBS | HEART RATE: 69 BPM | RESPIRATION RATE: 19 BRPM | OXYGEN SATURATION: 96 %

## 2021-07-26 DIAGNOSIS — I95.1 ORTHOSTATIC HYPOTENSION: ICD-10-CM

## 2021-07-26 DIAGNOSIS — E03.9 HYPOTHYROIDISM (ACQUIRED): ICD-10-CM

## 2021-07-26 LAB — EKG IMPRESSION: NORMAL

## 2021-07-26 PROCEDURE — 93000 ELECTROCARDIOGRAM COMPLETE: CPT | Performed by: INTERNAL MEDICINE

## 2021-07-26 PROCEDURE — 99213 OFFICE O/P EST LOW 20 MIN: CPT | Performed by: INTERNAL MEDICINE

## 2021-07-26 NOTE — PROGRESS NOTES
Chief Complaint   Patient presents with   • Syncope       Subjective:   Kalie Cook is a 60 y.o. female who presents today with history of orthostatic hypotension vasovagal syncope.  Resolved.  Off meds.  Doing well.  Retired.  Hydrating and using salt.    Past Medical History:   Diagnosis Date   • Asthma      Past Surgical History:   Procedure Laterality Date   • ABDOMINAL EXPLORATION     • PRIMARY C SECTION     • SINUSOTOMIES       Family History   Problem Relation Age of Onset   • Heart Disease Father      Social History     Socioeconomic History   • Marital status:      Spouse name: Not on file   • Number of children: Not on file   • Years of education: Not on file   • Highest education level: Not on file   Occupational History   • Not on file   Tobacco Use   • Smoking status: Never Smoker   • Smokeless tobacco: Never Used   Vaping Use   • Vaping Use: Never used   Substance and Sexual Activity   • Alcohol use: Yes     Comment: few times a week, usually beer   • Drug use: No   • Sexual activity: Yes     Partners: Male   Other Topics Concern   •  Service Not Asked   • Blood Transfusions Not Asked   • Caffeine Concern No   • Occupational Exposure Not Asked   • Hobby Hazards Not Asked   • Sleep Concern Not Asked   • Stress Concern Not Asked   • Weight Concern No   • Special Diet No   • Back Care Not Asked   • Exercise Yes   • Bike Helmet Not Asked   • Seat Belt Not Asked   • Self-Exams Not Asked   Social History Narrative   • Not on file     Social Determinants of Health     Financial Resource Strain:    • Difficulty of Paying Living Expenses:    Food Insecurity:    • Worried About Running Out of Food in the Last Year:    • Ran Out of Food in the Last Year:    Transportation Needs:    • Lack of Transportation (Medical):    • Lack of Transportation (Non-Medical):    Physical Activity:    • Days of Exercise per Week:    • Minutes of Exercise per Session:    Stress:    • Feeling of Stress :    Social  "Connections:    • Frequency of Communication with Friends and Family:    • Frequency of Social Gatherings with Friends and Family:    • Attends Taoist Services:    • Active Member of Clubs or Organizations:    • Attends Club or Organization Meetings:    • Marital Status:    Intimate Partner Violence:    • Fear of Current or Ex-Partner:    • Emotionally Abused:    • Physically Abused:    • Sexually Abused:      Allergies   Allergen Reactions   • Sulfa Drugs Hives     Outpatient Encounter Medications as of 7/26/2021   Medication Sig Dispense Refill   • levothyroxine (SYNTHROID) 75 MCG Tab TAKE 1 TABLET EVERY MORNINGON AN EMPTY STOMACH 90 tablet 0   • Mirabegron ER 50 MG TABLET SR 24 HR Take 1 tablet by mouth every day. 90 tablet 3   • Calcium Polycarbophil (FIBER-CAPS PO) Take 1 Tab by mouth 3 times a day.     • Cholecalciferol (VITAMIN D3 PO) Take 1 Cap by mouth every day.     • Nutritional Supplements (ESTROVEN PO) Take  by mouth.     • Mometasone Furo-Formoterol Fum 100-5 MCG/ACT Aerosol Inhale.     • budesonide (PULMICORT) 0.5 MG/2ML Suspension 500 mcg every day. Patient uses nebulizer solution in a mary jane pot like applicator with saline solution and performs a nasal irrigation twice a day     • montelukast (SINGULAIR) 10 MG Tab Take 10 mg by mouth every day.     • aspirin (ASA) 325 MG Tab Take 325 mg by mouth 2 Times a Day.     • Multiple Vitamin (MULTI VITAMIN DAILY PO) Take 1 tablet by mouth every day.       No facility-administered encounter medications on file as of 7/26/2021.     ROS     Objective:   /68 (BP Location: Left arm, Patient Position: Sitting, BP Cuff Size: Adult)   Pulse 69   Resp 19   Ht 1.715 m (5' 7.5\")   Wt 64.9 kg (143 lb)   LMP 01/15/2016   SpO2 96%   BMI 22.07 kg/m²     Physical Exam   Constitutional: She is oriented to person, place, and time. She appears well-developed.   HENT:   Head: Normocephalic and atraumatic.   Eyes: Pupils are equal, round, and reactive to light. "   Cardiovascular: Normal rate, regular rhythm and normal heart sounds. Exam reveals no gallop and no friction rub.   No murmur heard.  Pulmonary/Chest: Effort normal and breath sounds normal.   Abdominal: Soft. Bowel sounds are normal.   Musculoskeletal:         General: Normal range of motion.      Cervical back: Normal range of motion and neck supple.   Neurological: She is alert and oriented to person, place, and time. No cranial nerve deficit.   Skin: Skin is warm.   Psychiatric: Her behavior is normal. Judgment and thought content normal.       Assessment:     1. Orthostatic hypotension  EKG   2. Hypothyroidism (acquired)         Medical Decision Making:  Today's Assessment / Status / Plan:   1.  Orthostatic hypotension improved with hydration salt no meds at this time.  2.  Follow-up as needed.

## 2021-08-11 ENCOUNTER — HOSPITAL ENCOUNTER (OUTPATIENT)
Dept: LAB | Facility: MEDICAL CENTER | Age: 61
End: 2021-08-11
Attending: FAMILY MEDICINE
Payer: COMMERCIAL

## 2021-08-11 DIAGNOSIS — Z13.1 SCREENING FOR DIABETES MELLITUS (DM): ICD-10-CM

## 2021-08-11 DIAGNOSIS — Z12.11 SCREENING FOR COLON CANCER: ICD-10-CM

## 2021-08-11 DIAGNOSIS — Z12.31 ENCOUNTER FOR SCREENING MAMMOGRAM FOR BREAST CANCER: ICD-10-CM

## 2021-08-11 DIAGNOSIS — Z01.419 WELL WOMAN EXAM WITH ROUTINE GYNECOLOGICAL EXAM: ICD-10-CM

## 2021-08-11 DIAGNOSIS — Z13.6 SCREENING FOR ISCHEMIC HEART DISEASE: ICD-10-CM

## 2021-08-11 LAB
ANION GAP SERPL CALC-SCNC: 9 MMOL/L (ref 7–16)
BUN SERPL-MCNC: 15 MG/DL (ref 8–22)
CALCIUM SERPL-MCNC: 10 MG/DL (ref 8.5–10.5)
CHLORIDE SERPL-SCNC: 102 MMOL/L (ref 96–112)
CHOLEST SERPL-MCNC: 265 MG/DL (ref 100–199)
CO2 SERPL-SCNC: 27 MMOL/L (ref 20–33)
CREAT SERPL-MCNC: 0.76 MG/DL (ref 0.5–1.4)
FASTING STATUS PATIENT QL REPORTED: NORMAL
GLUCOSE SERPL-MCNC: 86 MG/DL (ref 65–99)
HDLC SERPL-MCNC: 79 MG/DL
LDLC SERPL CALC-MCNC: 165 MG/DL
POTASSIUM SERPL-SCNC: 4.2 MMOL/L (ref 3.6–5.5)
SODIUM SERPL-SCNC: 138 MMOL/L (ref 135–145)
TRIGL SERPL-MCNC: 107 MG/DL (ref 0–149)
TSH SERPL DL<=0.005 MIU/L-ACNC: 3.63 UIU/ML (ref 0.38–5.33)

## 2021-08-11 PROCEDURE — 80048 BASIC METABOLIC PNL TOTAL CA: CPT

## 2021-08-11 PROCEDURE — 84443 ASSAY THYROID STIM HORMONE: CPT

## 2021-08-11 PROCEDURE — 80061 LIPID PANEL: CPT

## 2021-08-11 PROCEDURE — 36415 COLL VENOUS BLD VENIPUNCTURE: CPT

## 2021-08-25 ENCOUNTER — OFFICE VISIT (OUTPATIENT)
Dept: URGENT CARE | Facility: CLINIC | Age: 61
End: 2021-08-25
Payer: COMMERCIAL

## 2021-08-25 VITALS
HEART RATE: 72 BPM | HEIGHT: 67 IN | BODY MASS INDEX: 22.29 KG/M2 | DIASTOLIC BLOOD PRESSURE: 66 MMHG | RESPIRATION RATE: 14 BRPM | OXYGEN SATURATION: 100 % | SYSTOLIC BLOOD PRESSURE: 118 MMHG | WEIGHT: 142 LBS | TEMPERATURE: 97 F

## 2021-08-25 DIAGNOSIS — J45.30 MILD PERSISTENT ASTHMA WITHOUT COMPLICATION: ICD-10-CM

## 2021-08-25 PROCEDURE — 94640 AIRWAY INHALATION TREATMENT: CPT | Performed by: FAMILY MEDICINE

## 2021-08-25 PROCEDURE — 99213 OFFICE O/P EST LOW 20 MIN: CPT | Mod: 25 | Performed by: FAMILY MEDICINE

## 2021-08-25 RX ORDER — SODIUM, POTASSIUM,MAG SULFATES 17.5-3.13G
SOLUTION, RECONSTITUTED, ORAL ORAL
COMMUNITY
Start: 2021-08-02 | End: 2021-08-25

## 2021-08-25 RX ORDER — IPRATROPIUM BROMIDE AND ALBUTEROL SULFATE 2.5; .5 MG/3ML; MG/3ML
3 SOLUTION RESPIRATORY (INHALATION) ONCE
Status: COMPLETED | OUTPATIENT
Start: 2021-08-25 | End: 2021-08-25

## 2021-08-25 RX ORDER — DEXAMETHASONE SODIUM PHOSPHATE 4 MG/ML
8 INJECTION, SOLUTION INTRA-ARTICULAR; INTRALESIONAL; INTRAMUSCULAR; INTRAVENOUS; SOFT TISSUE ONCE
Status: COMPLETED | OUTPATIENT
Start: 2021-08-25 | End: 2021-08-25

## 2021-08-25 RX ORDER — ALBUTEROL SULFATE 90 UG/1
AEROSOL, METERED RESPIRATORY (INHALATION)
COMMUNITY
Start: 2021-08-25

## 2021-08-25 RX ORDER — PREDNISONE 10 MG/1
30 TABLET ORAL EVERY MORNING
Qty: 21 TABLET | Refills: 0 | Status: SHIPPED | OUTPATIENT
Start: 2021-08-25 | End: 2021-09-01

## 2021-08-25 RX ADMIN — IPRATROPIUM BROMIDE AND ALBUTEROL SULFATE 3 ML: 2.5; .5 SOLUTION RESPIRATORY (INHALATION) at 15:03

## 2021-08-25 RX ADMIN — DEXAMETHASONE SODIUM PHOSPHATE 8 MG: 4 INJECTION, SOLUTION INTRA-ARTICULAR; INTRALESIONAL; INTRAMUSCULAR; INTRAVENOUS; SOFT TISSUE at 15:01

## 2021-08-25 ASSESSMENT — ENCOUNTER SYMPTOMS: WHEEZING: 1

## 2021-08-25 NOTE — PROGRESS NOTES
Subjective     Kalie Cook is a 60 y.o. female who presents with Asthma (x5 days )      - This is a pleasant and nontoxic appearing 60 y.o. female with c/o asthma acting up x 5-6 days. Increased tightness in chest and resolves for a few hours w/ rescue inhaler. No CP or NVFC      ALLERGIES:  Sulfa drugs     PMH:  Past Medical History:   Diagnosis Date   • Asthma         PSH:  Past Surgical History:   Procedure Laterality Date   • ABDOMINAL EXPLORATION     • PRIMARY C SECTION     • SINUSOTOMIES         MEDS:    Current Outpatient Medications:   •  albuterol 108 (90 Base) MCG/ACT Aero Soln inhalation aerosol, , Disp: , Rfl:   •  predniSONE (DELTASONE) 10 MG Tab, Take 3 Tablets by mouth every morning for 7 days., Disp: 21 Tablet, Rfl: 0  •  levothyroxine (SYNTHROID) 75 MCG Tab, TAKE 1 TABLET EVERY MORNINGON AN EMPTY STOMACH, Disp: 90 tablet, Rfl: 0  •  Mirabegron ER 50 MG TABLET SR 24 HR, Take 1 tablet by mouth every day., Disp: 90 tablet, Rfl: 3  •  Calcium Polycarbophil (FIBER-CAPS PO), Take 1 Tab by mouth 3 times a day., Disp: , Rfl:   •  Cholecalciferol (VITAMIN D3 PO), Take 1 Cap by mouth every day., Disp: , Rfl:   •  Nutritional Supplements (ESTROVEN PO), Take  by mouth., Disp: , Rfl:   •  Mometasone Furo-Formoterol Fum 100-5 MCG/ACT Aerosol, Inhale., Disp: , Rfl:   •  budesonide (PULMICORT) 0.5 MG/2ML Suspension, 500 mcg every day. Patient uses nebulizer solution in a mary jane pot like applicator with saline solution and performs a nasal irrigation twice a day, Disp: , Rfl:   •  montelukast (SINGULAIR) 10 MG Tab, Take 10 mg by mouth every day., Disp: , Rfl:   •  aspirin (ASA) 325 MG Tab, Take 325 mg by mouth 2 Times a Day., Disp: , Rfl:   •  Multiple Vitamin (MULTI VITAMIN DAILY PO), Take 1 tablet by mouth every day., Disp: , Rfl:     Current Facility-Administered Medications:   •  dexamethasone (DECADRON) injection 8 mg, 8 mg, Oral, Once, Vince Cartagena M.D.  •  ipratropium-albuterol (DUONEB) nebulizer  "solution, 3 mL, Nebulization, Once, Vince Cartagena M.D.    ** I have documented what I find to be significant in regards to past medical, social, family and surgical history  in my HPI or under PMH/PSH/FH review section, otherwise it is noncontributory **           HPI    Review of Systems   Respiratory: Positive for wheezing.    All other systems reviewed and are negative.         Objective     /66   Pulse 61   Temp 36.1 °C (97 °F) (Temporal)   Resp 14   Ht 1.702 m (5' 7\")   Wt 64.4 kg (142 lb)   LMP 01/15/2016   SpO2 98%   Breastfeeding No   BMI 22.24 kg/m²      Physical Exam  Vitals and nursing note reviewed.   Constitutional:       General: She is not in acute distress.     Appearance: Normal appearance. She is well-developed.   HENT:      Head: Normocephalic and atraumatic.      Mouth/Throat:      Mouth: Mucous membranes are moist.      Pharynx: Oropharynx is clear.   Eyes:      General: No scleral icterus.  Cardiovascular:      Heart sounds: Normal heart sounds. No murmur heard.     Pulmonary:      Effort: Pulmonary effort is normal. No respiratory distress.      Breath sounds: Wheezing present.   Skin:     Coloration: Skin is not jaundiced or pale.   Neurological:      Mental Status: She is alert.      Motor: No abnormal muscle tone.   Psychiatric:         Mood and Affect: Mood normal.         Behavior: Behavior normal.               Assessment & Plan          1. Mild persistent asthma without complication  dexamethasone (DECADRON) injection 8 mg    ipratropium-albuterol (DUONEB) nebulizer solution    predniSONE (DELTASONE) 10 MG Tab       - Dx, plan & d/c instructions discussed w/ patient   - Rest, stay hydrated OTC Motrin and/or Tylenol as needed  - E.R. precautions discussed     Asked to kindly follow up with their PCP's office in 2-3 days for a recheck, ER if not improving or feeling/getting worse.    Any realistic side effects of medications that may have been given today reviewed. "     Patient left in stable condition

## 2021-09-03 ENCOUNTER — HOSPITAL ENCOUNTER (OUTPATIENT)
Dept: RADIOLOGY | Facility: MEDICAL CENTER | Age: 61
End: 2021-09-03
Attending: FAMILY MEDICINE
Payer: COMMERCIAL

## 2021-09-03 DIAGNOSIS — Z12.31 VISIT FOR SCREENING MAMMOGRAM: ICD-10-CM

## 2021-09-03 PROCEDURE — 77063 BREAST TOMOSYNTHESIS BI: CPT

## 2021-09-14 ENCOUNTER — OFFICE VISIT (OUTPATIENT)
Dept: MEDICAL GROUP | Facility: MEDICAL CENTER | Age: 61
End: 2021-09-14
Payer: COMMERCIAL

## 2021-09-14 VITALS
WEIGHT: 141.09 LBS | BODY MASS INDEX: 22.15 KG/M2 | RESPIRATION RATE: 16 BRPM | DIASTOLIC BLOOD PRESSURE: 82 MMHG | TEMPERATURE: 97 F | SYSTOLIC BLOOD PRESSURE: 126 MMHG | HEART RATE: 70 BPM | HEIGHT: 67 IN | OXYGEN SATURATION: 97 %

## 2021-09-14 DIAGNOSIS — E78.5 DYSLIPIDEMIA: ICD-10-CM

## 2021-09-14 DIAGNOSIS — E03.9 HYPOTHYROIDISM (ACQUIRED): ICD-10-CM

## 2021-09-14 DIAGNOSIS — J45.30 MILD PERSISTENT ASTHMA WITHOUT COMPLICATION: ICD-10-CM

## 2021-09-14 DIAGNOSIS — J45.901 EXACERBATION OF ASTHMA, UNSPECIFIED ASTHMA SEVERITY, UNSPECIFIED WHETHER PERSISTENT: ICD-10-CM

## 2021-09-14 PROCEDURE — 99214 OFFICE O/P EST MOD 30 MIN: CPT | Performed by: FAMILY MEDICINE

## 2021-09-14 RX ORDER — PREDNISONE 20 MG/1
TABLET ORAL
Qty: 30 TABLET | Refills: 2 | Status: SHIPPED
Start: 2021-09-14 | End: 2021-11-14

## 2021-09-14 NOTE — ASSESSMENT & PLAN NOTE
She had first flair in a while   Overall she has excellent control with her pulmicort and rarely needs rescue inh  She was evaluated in UC   Steroids oral improved her symptoms but she is still having nightly symptoms   We will do higher dose of steroid with longer taper   Refills provided as she does have poor access with her current pulmonologist   She is seeking to est with new pulmonologist to assure that her asthma is under optimal control as her current pulmonologist moved out of town

## 2021-09-14 NOTE — PROGRESS NOTES
This medical record contains text that has been entered with the assistance of computer voice recognition and dictation software.  Therefore, it may contain unintended errors in text, spelling, punctuation, or grammar        Chief Complaint   Patient presents with   • Follow-Up     lab, mammo   • Asthma     some asthma concerns, starting 3 weeks ago pt reports having difficulty breathing and wheezing, went to  and got inhaler and steriods, still having probelms and waking up at night       Kalie Cook is a 60 y.o. female here evaluation and management of:     Review dyslipidemia labs   Had asthma exacerbation would like to discuss     Current Outpatient Medications   Medication Sig Dispense Refill   • predniSONE (DELTASONE) 20 MG Tab Take 3 tabs daily for 5 days then 2 tabs daily for 5 days then 1 tab daily for 5 days 30 Tablet 2   • albuterol 108 (90 Base) MCG/ACT Aero Soln inhalation aerosol      • levothyroxine (SYNTHROID) 75 MCG Tab TAKE 1 TABLET EVERY MORNINGON AN EMPTY STOMACH 90 tablet 0   • Mirabegron ER 50 MG TABLET SR 24 HR Take 1 tablet by mouth every day. 90 tablet 3   • Calcium Polycarbophil (FIBER-CAPS PO) Take 1 Tab by mouth 3 times a day.     • Cholecalciferol (VITAMIN D3 PO) Take 1 Cap by mouth every day.     • Nutritional Supplements (ESTROVEN PO) Take  by mouth.     • Mometasone Furo-Formoterol Fum 100-5 MCG/ACT Aerosol Inhale.     • budesonide (PULMICORT) 0.5 MG/2ML Suspension 500 mcg every day. Patient uses nebulizer solution in a mary jane pot like applicator with saline solution and performs a nasal irrigation twice a day     • montelukast (SINGULAIR) 10 MG Tab Take 10 mg by mouth every day.     • aspirin (ASA) 325 MG Tab Take 325 mg by mouth 2 Times a Day.     • Multiple Vitamin (MULTI VITAMIN DAILY PO) Take 1 tablet by mouth every day.       No current facility-administered medications for this visit.     Patient Active Problem List    Diagnosis Date Noted   • Dyslipidemia 09/14/2021   •  "Well woman exam with routine gynecological exam 07/21/2021   • Urge incontinence 07/21/2021   • Encounter for weight loss counseling 06/24/2020   • Mild persistent asthma without complication 01/04/2018   • Orthostatic hypotension 05/19/2016   • Hypothyroidism (acquired) 04/25/2016   • Depression 04/25/2016     Past Surgical History:   Procedure Laterality Date   • ABDOMINAL EXPLORATION     • PRIMARY C SECTION     • SINUSOTOMIES        Social History     Tobacco Use   • Smoking status: Never Smoker   • Smokeless tobacco: Never Used   Vaping Use   • Vaping Use: Never used   Substance Use Topics   • Alcohol use: Yes     Comment: few times a week, usually beer   • Drug use: No     Family History   Problem Relation Age of Onset   • Heart Disease Father            ROS    all review of system completed and negative except for those listed above     Objective:     /82 (BP Location: Right arm, Patient Position: Sitting, BP Cuff Size: Adult)   Pulse 70   Temp 36.1 °C (97 °F) (Temporal)   Resp 16   Ht 1.702 m (5' 7\")   Wt 64 kg (141 lb 1.5 oz)   SpO2 97%  Body mass index is 22.1 kg/m².  Physical Exam:    Constitutional: Alert, no distress.  Skin: Warm, dry, good turgor, no rashes in visible areas.  Eye: Equal, round and reactive, conjunctiva clear, lids normal.  ENMT: Lips without lesions, good dentition, oropharynx clear.  Neck: Trachea midline, no masses, no thyromegaly. No cervical or supraclavicular lymphadenopathy.  Respiratory: Unlabored respiratory effort, lungs clear to auscultation, no wheezes, no ronchi.  Cardiovascular: Normal S1, S2, no murmur, no edema.  Abdomen: Soft, non-tender, no masses, no hepatosplenomegaly.  Psych: Alert and oriented x3, normal affect and mood.          Assessment and Plan:   The following treatment plan was discussed        Problem List Items Addressed This Visit     Hypothyroidism (acquired)     Tolerating synthroid   Euthyroid              Mild persistent asthma without " complication     She had first flair in a while   Overall she has excellent control with her pulmicort and rarely needs rescue inh  She was evaluated in UC   Steroids oral improved her symptoms but she is still having nightly symptoms   We will do higher dose of steroid with longer taper   Refills provided as she does have poor access with her current pulmonologist   She is seeking to est with new pulmonologist to assure that her asthma is under optimal control as her current pulmonologist moved out of town              Dyslipidemia     10-Year ASCVD Risk  3.5% calculated risk  2.6% risk with optimal risk factors     Statin offered and discussed   Extra time spent calculating risk   Plan to check annually   She is actually eating excellent diet already     Diet and lifestyle mod discussed in detail     Results for LIONEL SOLIMAN (MRN [id number]) as of 9/14/2021 10:03   Ref. Range 8/11/2021 07:34 9/3/2021 14:07   Sodium Latest Ref Range: 135 - 145 mmol/L 138    Potassium Latest Ref Range: 3.6 - 5.5 mmol/L 4.2    Chloride Latest Ref Range: 96 - 112 mmol/L 102    Co2 Latest Ref Range: 20 - 33 mmol/L 27    Anion Gap Latest Ref Range: 7.0 - 16.0  9.0    Glucose Latest Ref Range: 65 - 99 mg/dL 86    Bun Latest Ref Range: 8 - 22 mg/dL 15    Creatinine Latest Ref Range: 0.50 - 1.40 mg/dL 0.76    GFR If  Latest Ref Range: >60 mL/min/1.73 m 2 >60    GFR If Non  Latest Ref Range: >60 mL/min/1.73 m 2 >60    Calcium Latest Ref Range: 8.5 - 10.5 mg/dL 10.0    Fasting Status Unknown Fasting    Cholesterol,Tot Latest Ref Range: 100 - 199 mg/dL 265 (H)    Triglycerides Latest Ref Range: 0 - 149 mg/dL 107    HDL Latest Ref Range: >=40 mg/dL 79    LDL Latest Ref Range: <100 mg/dL 165 (H)                 Other Visit Diagnoses     Exacerbation of asthma, unspecified asthma severity, unspecified whether persistent        Relevant Medications    predniSONE (DELTASONE) 20 MG Tab    Other Relevant Orders     REFERRAL TO PULMONARY AND SLEEP MEDICINE                Instructed to follow up if symptoms worsen or fail to improve, ER/UC precautions discussed as well    Madeline Anderson MD  [facility], [address]   [place] [id number]  Phone: [phone]

## 2021-09-14 NOTE — ASSESSMENT & PLAN NOTE
10-Year ASCVD Risk  3.5% calculated risk  2.6% risk with optimal risk factors     Statin offered and discussed   Extra time spent calculating risk   Plan to check annually   She is actually eating excellent diet already     Diet and lifestyle mod discussed in detail     Results for LIONEL SOLIMAN (MRN [id number]) as of 9/14/2021 10:03   Ref. Range 8/11/2021 07:34 9/3/2021 14:07   Sodium Latest Ref Range: 135 - 145 mmol/L 138    Potassium Latest Ref Range: 3.6 - 5.5 mmol/L 4.2    Chloride Latest Ref Range: 96 - 112 mmol/L 102    Co2 Latest Ref Range: 20 - 33 mmol/L 27    Anion Gap Latest Ref Range: 7.0 - 16.0  9.0    Glucose Latest Ref Range: 65 - 99 mg/dL 86    Bun Latest Ref Range: 8 - 22 mg/dL 15    Creatinine Latest Ref Range: 0.50 - 1.40 mg/dL 0.76    GFR If  Latest Ref Range: >60 mL/min/1.73 m 2 >60    GFR If Non  Latest Ref Range: >60 mL/min/1.73 m 2 >60    Calcium Latest Ref Range: 8.5 - 10.5 mg/dL 10.0    Fasting Status Unknown Fasting    Cholesterol,Tot Latest Ref Range: 100 - 199 mg/dL 265 (H)    Triglycerides Latest Ref Range: 0 - 149 mg/dL 107    HDL Latest Ref Range: >=40 mg/dL 79    LDL Latest Ref Range: <100 mg/dL 165 (H)

## 2021-09-21 ENCOUNTER — PATIENT MESSAGE (OUTPATIENT)
Dept: MEDICAL GROUP | Facility: MEDICAL CENTER | Age: 61
End: 2021-09-21

## 2021-10-25 DIAGNOSIS — E03.9 HYPOTHYROIDISM, UNSPECIFIED TYPE: ICD-10-CM

## 2021-10-27 RX ORDER — LEVOTHYROXINE SODIUM 0.07 MG/1
TABLET ORAL
Qty: 90 TABLET | Refills: 0 | Status: SHIPPED | OUTPATIENT
Start: 2021-10-27 | End: 2022-01-18

## 2021-11-14 ENCOUNTER — OFFICE VISIT (OUTPATIENT)
Dept: URGENT CARE | Facility: CLINIC | Age: 61
End: 2021-11-14
Payer: COMMERCIAL

## 2021-11-14 ENCOUNTER — HOSPITAL ENCOUNTER (OUTPATIENT)
Facility: MEDICAL CENTER | Age: 61
End: 2021-11-14
Attending: PHYSICIAN ASSISTANT
Payer: COMMERCIAL

## 2021-11-14 VITALS
RESPIRATION RATE: 16 BRPM | OXYGEN SATURATION: 94 % | DIASTOLIC BLOOD PRESSURE: 80 MMHG | BODY MASS INDEX: 22.66 KG/M2 | HEIGHT: 67 IN | TEMPERATURE: 96.8 F | WEIGHT: 144.4 LBS | SYSTOLIC BLOOD PRESSURE: 118 MMHG | HEART RATE: 70 BPM

## 2021-11-14 DIAGNOSIS — J02.9 PHARYNGITIS, UNSPECIFIED ETIOLOGY: ICD-10-CM

## 2021-11-14 DIAGNOSIS — Z87.09 HISTORY OF ACUTE BACTERIAL SINUSITIS: ICD-10-CM

## 2021-11-14 DIAGNOSIS — R05.9 COUGH: ICD-10-CM

## 2021-11-14 LAB
INT CON NEG: NEGATIVE
INT CON POS: POSITIVE
S PYO AG THROAT QL: NEGATIVE

## 2021-11-14 PROCEDURE — U0003 INFECTIOUS AGENT DETECTION BY NUCLEIC ACID (DNA OR RNA); SEVERE ACUTE RESPIRATORY SYNDROME CORONAVIRUS 2 (SARS-COV-2) (CORONAVIRUS DISEASE [COVID-19]), AMPLIFIED PROBE TECHNIQUE, MAKING USE OF HIGH THROUGHPUT TECHNOLOGIES AS DESCRIBED BY CMS-2020-01-R: HCPCS

## 2021-11-14 PROCEDURE — 87880 STREP A ASSAY W/OPTIC: CPT | Performed by: PHYSICIAN ASSISTANT

## 2021-11-14 PROCEDURE — 99214 OFFICE O/P EST MOD 30 MIN: CPT | Performed by: PHYSICIAN ASSISTANT

## 2021-11-14 PROCEDURE — U0005 INFEC AGEN DETEC AMPLI PROBE: HCPCS

## 2021-11-14 RX ORDER — AMOXICILLIN AND CLAVULANATE POTASSIUM 875; 125 MG/1; MG/1
1 TABLET, FILM COATED ORAL 2 TIMES DAILY
Qty: 14 TABLET | Refills: 0 | Status: SHIPPED | OUTPATIENT
Start: 2021-11-14 | End: 2021-11-14

## 2021-11-14 RX ORDER — AMOXICILLIN AND CLAVULANATE POTASSIUM 875; 125 MG/1; MG/1
1 TABLET, FILM COATED ORAL 2 TIMES DAILY
Qty: 14 TABLET | Refills: 0 | Status: SHIPPED | OUTPATIENT
Start: 2021-11-14 | End: 2021-11-21

## 2021-11-14 ASSESSMENT — ENCOUNTER SYMPTOMS
CHILLS: 1
SORE THROAT: 1
ABDOMINAL PAIN: 0
SHORTNESS OF BREATH: 0
DIARRHEA: 0
COUGH: 1
VOMITING: 0
HEADACHES: 1
WHEEZING: 0
FEVER: 0
MYALGIAS: 1
SPUTUM PRODUCTION: 0

## 2021-11-14 NOTE — PROGRESS NOTES
Subjective:   Kalie Cook is a 60 y.o. female who presents for Coronavirus Screening (Had covid vaccines and a booster. Has cold/flu symptoms for 10 days.  The patient is concerned she may has a sinus infection because she's had 4 sinus surgeries. )      HPI  60 y.o. female presents to urgent care with new problem to provider of sore throat, congestion, sinus pain/pressure, cough, and fatigue onset about 10 days ago.  Denies fevers.  Patient reports history of asthma, however denies symptoms of wheezing or shortness of breath.  She has not been having to use rescue albuterol inhaler.  She also reports history of sinus infection and states this feels similar.  Patient vaccinated for COVID-19, denies confirmed exposure to COVID or sick contacts.   Denies other associated aggravating or alleviating factors.     Review of Systems   Constitutional: Positive for chills and malaise/fatigue. Negative for fever.   HENT: Positive for congestion and sore throat.    Respiratory: Positive for cough. Negative for sputum production, shortness of breath and wheezing.    Cardiovascular: Negative for chest pain.   Gastrointestinal: Negative for abdominal pain, diarrhea and vomiting.   Musculoskeletal: Positive for myalgias.   Neurological: Positive for headaches.       Patient Active Problem List   Diagnosis   • Hypothyroidism (acquired)   • Depression   • Orthostatic hypotension   • Mild persistent asthma without complication   • Encounter for weight loss counseling   • Well woman exam with routine gynecological exam   • Urge incontinence   • Dyslipidemia     Past Surgical History:   Procedure Laterality Date   • ABDOMINAL EXPLORATION     • PRIMARY C SECTION     • SINUSOTOMIES       Social History     Tobacco Use   • Smoking status: Never Smoker   • Smokeless tobacco: Never Used   Vaping Use   • Vaping Use: Never used   Substance Use Topics   • Alcohol use: Yes     Comment: few times a week, usually beer   • Drug use: No     "  Family History   Problem Relation Age of Onset   • Heart Disease Father       (Allergies, Medications, & Tobacco/Substance Use were reconciled by the Medical Assistant and reviewed by myself. The family history is prepopulated)     Objective:     /80 (BP Location: Right arm, Patient Position: Sitting, BP Cuff Size: Adult)   Pulse 70   Temp 36 °C (96.8 °F) (Temporal)   Resp 16   Ht 1.702 m (5' 7\")   Wt 65.5 kg (144 lb 6.4 oz)   LMP 01/15/2016   SpO2 94%   BMI 22.62 kg/m²     Physical Exam  Vitals reviewed.   Constitutional:       General: She is not in acute distress.     Appearance: Normal appearance. She is not ill-appearing or diaphoretic.   HENT:      Head: Normocephalic and atraumatic.      Nose: Nose normal.      Mouth/Throat:      Mouth: Mucous membranes are moist.      Pharynx: Posterior oropharyngeal erythema present. No oropharyngeal exudate.   Eyes:      Conjunctiva/sclera: Conjunctivae normal.   Cardiovascular:      Rate and Rhythm: Normal rate and regular rhythm.      Heart sounds: Normal heart sounds. No murmur heard.  No friction rub. No gallop.    Pulmonary:      Effort: Pulmonary effort is normal. No respiratory distress.      Breath sounds: Normal breath sounds. No wheezing, rhonchi or rales.   Musculoskeletal:         General: Normal range of motion.      Cervical back: Normal range of motion and neck supple.   Lymphadenopathy:      Cervical: Cervical adenopathy present.   Skin:     General: Skin is warm and dry.      Findings: No rash.   Neurological:      General: No focal deficit present.      Mental Status: She is alert and oriented to person, place, and time.   Psychiatric:         Mood and Affect: Mood normal.         Behavior: Behavior normal.         Thought Content: Thought content normal.         Judgment: Judgment normal.         Assessment/Plan:     1. Cough  COVID/SARS CoV-2 PCR   2. Pharyngitis, unspecified etiology  POCT Rapid Strep A   3. History of acute bacterial " sinusitis  amoxicillin-clavulanate (AUGMENTIN) 875-125 MG Tab    DISCONTINUED: amoxicillin-clavulanate (AUGMENTIN) 875-125 MG Tab     Point-of-care testing for strep is negative in clinic today    Patient given contingent course of Augmentin to take if her symptoms persist or worsen and her COVID-19 testing is negative.  COVID-19 testing pending.  Patient may obtain results through [facility].  Follow CDC guidelines for self-isolation.  Recommend OTC Tylenol/Motrin and cold/cough medication for symptomatic relief.  Drink plenty of fluids and rest.  Discussed red flags including development of respiratory distress.     Differential diagnosis, natural history, supportive care, and indications for immediate follow-up discussed.    Advised the patient to follow-up with the primary care physician for recheck, reevaluation, and consideration of further management.  Patient verbalized understanding of treatment plan and has no further questions regarding care.     I personally reviewed prior external notes and test results pertinent to today's visit.     Please note that this dictation was created using voice recognition software. I have made a reasonable attempt to correct obvious errors, but I expect that there are errors of grammar and possibly content that I did not discover before finalizing the note.    This note was electronically signed by Cecilia Ruiz PA-C

## 2021-11-15 DIAGNOSIS — R05.9 COUGH: ICD-10-CM

## 2021-11-15 LAB — COVID ORDER STATUS COVID19: NORMAL

## 2021-11-16 LAB
SARS-COV-2 RNA RESP QL NAA+PROBE: NOTDETECTED
SPECIMEN SOURCE: NORMAL

## 2022-01-16 DIAGNOSIS — E03.9 HYPOTHYROIDISM, UNSPECIFIED TYPE: ICD-10-CM

## 2022-01-18 RX ORDER — LEVOTHYROXINE SODIUM 0.07 MG/1
TABLET ORAL
Qty: 90 TABLET | Refills: 0 | Status: SHIPPED | OUTPATIENT
Start: 2022-01-18 | End: 2022-02-22

## 2022-02-19 DIAGNOSIS — E03.9 HYPOTHYROIDISM, UNSPECIFIED TYPE: ICD-10-CM

## 2022-02-22 RX ORDER — LEVOTHYROXINE SODIUM 0.07 MG/1
TABLET ORAL
Qty: 90 TABLET | Refills: 0 | Status: SHIPPED | OUTPATIENT
Start: 2022-02-22 | End: 2022-05-01 | Stop reason: SDUPTHER

## 2022-04-22 ENCOUNTER — OFFICE VISIT (OUTPATIENT)
Dept: MEDICAL GROUP | Facility: MEDICAL CENTER | Age: 62
End: 2022-04-22
Payer: COMMERCIAL

## 2022-04-22 VITALS
RESPIRATION RATE: 16 BRPM | OXYGEN SATURATION: 95 % | BODY MASS INDEX: 22.49 KG/M2 | HEART RATE: 60 BPM | WEIGHT: 143.3 LBS | TEMPERATURE: 97 F | SYSTOLIC BLOOD PRESSURE: 118 MMHG | DIASTOLIC BLOOD PRESSURE: 78 MMHG | HEIGHT: 67 IN

## 2022-04-22 DIAGNOSIS — Z13.6 SCREENING FOR ISCHEMIC HEART DISEASE: ICD-10-CM

## 2022-04-22 DIAGNOSIS — E03.9 HYPOTHYROIDISM, UNSPECIFIED TYPE: ICD-10-CM

## 2022-04-22 DIAGNOSIS — R55 SYNCOPE, UNSPECIFIED SYNCOPE TYPE: ICD-10-CM

## 2022-04-22 DIAGNOSIS — Z00.00 PREVENTATIVE HEALTH CARE: ICD-10-CM

## 2022-04-22 DIAGNOSIS — R42 DIZZINESS: ICD-10-CM

## 2022-04-22 PROCEDURE — 99214 OFFICE O/P EST MOD 30 MIN: CPT | Performed by: FAMILY MEDICINE

## 2022-04-22 ASSESSMENT — PATIENT HEALTH QUESTIONNAIRE - PHQ9: CLINICAL INTERPRETATION OF PHQ2 SCORE: 0

## 2022-04-22 NOTE — PROGRESS NOTES
This medical record contains text that has been entered with the assistance of computer voice recognition and dictation software.  Therefore, it may contain unintended errors in text, spelling, punctuation, or grammar        Chief Complaint   Patient presents with   • Other     Pt experiencing brain fog and dizziness. Pt reports passing out on March 27th - has happened before but was previously related to blood pressure. She took BP after this episode and has noticed her BP is higher now than then (110/80s). Pt also reports frequent urination and very thirsty.       Kalie Cook is a 61 y.o. female here evaluation and management of:     Current Outpatient Medications   Medication Sig Dispense Refill   • levothyroxine (SYNTHROID) 75 MCG Tab TAKE 1 TABLET EVERY MORNINGON AN EMPTY STOMACH 90 Tablet 0   • albuterol 108 (90 Base) MCG/ACT Aero Soln inhalation aerosol      • Calcium Polycarbophil (FIBER-CAPS PO) Take 1 Tab by mouth 3 times a day.     • Cholecalciferol (VITAMIN D3 PO) Take 1 Cap by mouth every day.     • Nutritional Supplements (ESTROVEN PO) Take  by mouth. Every other day     • Mometasone Furo-Formoterol Fum 100-5 MCG/ACT Aerosol Inhale.     • budesonide (PULMICORT) 0.5 MG/2ML Suspension 500 mcg every day. Patient uses nebulizer solution in a mary jane pot like applicator with saline solution and performs a nasal irrigation twice a day     • montelukast (SINGULAIR) 10 MG Tab Take 10 mg by mouth every day.     • Multiple Vitamin (MULTI VITAMIN DAILY PO) Take 1 tablet by mouth every day.     • aspirin (ASA) 325 MG Tab Take 325 mg by mouth 2 Times a Day.       No current facility-administered medications for this visit.     Patient Active Problem List    Diagnosis Date Noted   • Dizziness 04/22/2022   • Dyslipidemia 09/14/2021   • Well woman exam with routine gynecological exam 07/21/2021   • Urge incontinence 07/21/2021   • Encounter for weight loss counseling 06/24/2020   • Mild persistent asthma without  "complication 01/04/2018   • Orthostatic hypotension 05/19/2016   • Hypothyroidism (acquired) 04/25/2016   • Depression 04/25/2016     Past Surgical History:   Procedure Laterality Date   • ABDOMINAL EXPLORATION     • PRIMARY C SECTION     • SINUSOTOMIES        Social History     Tobacco Use   • Smoking status: Never Smoker   • Smokeless tobacco: Never Used   Vaping Use   • Vaping Use: Never used   Substance Use Topics   • Alcohol use: Yes     Comment: few times a week, usually beer   • Drug use: No     Family History   Problem Relation Age of Onset   • Heart Disease Father            ROS    all review of system completed and negative except for those listed above     Objective:     /78 (BP Location: Left arm, Patient Position: Sitting, BP Cuff Size: Adult)   Pulse 60   Temp 36.1 °C (97 °F) (Temporal)   Resp 16   Ht 1.702 m (5' 7\")   Wt 65 kg (143 lb 4.8 oz)   SpO2 95%  Body mass index is 22.44 kg/m².  Physical Exam:    Constitutional: Alert, no distress.  Skin: Warm, dry, good turgor, no rashes in visible areas.  Eye: Equal, round and reactive, conjunctiva clear, lids normal.  ENMT: Lips without lesions, good dentition, oropharynx clear.  Neck: Trachea midline, no masses, no thyromegaly. No cervical or supraclavicular lymphadenopathy.  Respiratory: Unlabored respiratory effort, lungs clear to auscultation, no wheezes, no ronchi.  Cardiovascular: Normal S1, S2, no murmur, no edema.  Abdomen: Soft, non-tender, no masses, no hepatosplenomegaly.  Psych: Alert and oriented x3, normal affect and mood.        Assessment and Plan:   The following treatment plan was discussed        Problem List Items Addressed This Visit     Dizziness     She does not believe that she is having vertigo symptoms   More \"fogginess\" fatigue tired \"off\" \"foggy brain\"   Definitely some orthostatic triggers    Went to optometry   Prescription changed significantly     She did have a syncopal episode about 1 mo ago where she stood up " "from sitting and while she was walking felt pre syncopal symptoms fell and passed out and \"came to\" rather quickly     No palpitations   No angina    She has been feeling poor for about 9 mo   Symptoms with occur throughout the day somewhat persistently rather than clear episodes     No vertigo with turning head too fast     Will do broad work up   Labs and CT scan     Consider heart work up and consulting with her cardiologist   Consider getting her ENT doc involved     Short term follow up and strict ER precautions   30+ min spent   EKG today per my read NSR                  Relevant Orders    CBC WITH DIFFERENTIAL    Comp Metabolic Panel    HEMOGLOBIN A1C    Lipid Profile    CT-HEAD WITH & W/O    EKG - Clinic Performed    EC-ECHOCARDIOGRAM REST/STRESS W/ CONT    [facility] - Cardiology Performed (48HR)    REFERRAL TO CARDIOLOGY    Referral to ENT      Other Visit Diagnoses     Preventative health care        Relevant Orders    Lipid Profile    CT-HEAD WITH & W/O    Screening for ischemic heart disease        Relevant Orders    Lipid Profile    CT-HEAD WITH & W/O    Hypothyroidism, unspecified type        Relevant Orders    TSH    Syncope, unspecified syncope type        Relevant Orders    EKG - Clinic Performed    EC-ECHOCARDIOGRAM REST/STRESS W/ CONT    HOLTER - Cardiology Performed (48HR)    REFERRAL TO CARDIOLOGY                Instructed to follow up if symptoms worsen or fail to improve, ER/UC precautions discussed as well    Madeline Anderson MD  [facility], Family Medicine   [address] [place] [address] Pkwy   [place] [phone]  Phone: [phone]               "

## 2022-04-22 NOTE — LETTER
[facility], later [facility]  Madeline Anderson M.D.  [address]  [place] [phone]  Fax: [phone]   Authorization for Release/Disclosure of   Protected Health Information   Name: AKLIE SOLIMAN : 1960 SSN: [id number]   Address: [address] [place] [address]  [place] [id number] Phone:    [phone] (home)    I authorize the entity listed below to release/disclose the PHI below to:   [facility]/Madeline Anderson M.D. and Madeline Anderson M.D.   Provider or Entity Name:     Address   City, State, Zip   Phone:      Fax:     Reason for request: continuity of care   Information to be released:    [  ] LAST COLONOSCOPY,  including any PATH REPORT and follow-up  [  ] LAST FIT/COLOGUARD RESULT [  ] LAST DEXA  [  ] LAST MAMMOGRAM  [  ] LAST PAP  [  ] LAST LABS [  ] RETINA EXAM REPORT  [  ] IMMUNIZATION RECORDS  [  ] Release all info      [  ] Check here and initial the line next to each item to release ALL health information INCLUDING  _____ Care and treatment for drug and / or alcohol abuse  _____ HIV testing, infection status, or AIDS  _____ Genetic Testing    DATES OF SERVICE OR TIME PERIOD TO BE DISCLOSED: _____________  I understand and acknowledge that:  * This Authorization may be revoked at any time by you in writing, except if your health information has already been used or disclosed.  * Your health information that will be used or disclosed as a result of you signing this authorization could be re-disclosed by the recipient. If this occurs, your re-disclosed health information may no longer be protected by State or Federal laws.  * You may refuse to sign this Authorization. Your refusal will not affect your ability to obtain treatment.  * This Authorization becomes effective upon signing and will  on (date) __________.      If no date is indicated, this Authorization will  one (1) year from the signature date.    Name: Kalie Soliman    Signature:   Date:     2022       PLEASE FAX REQUESTED RECORDS BACK  TO: [phone]

## 2022-04-22 NOTE — ASSESSMENT & PLAN NOTE
"She does not believe that she is having vertigo symptoms   More \"fogginess\" fatigue tired \"off\" \"foggy brain\"   Definitely some orthostatic triggers    Went to optometry   Prescription changed significantly     She did have a syncopal episode about 1 mo ago where she stood up from sitting and while she was walking felt pre syncopal symptoms fell and passed out and \"came to\" rather quickly     No palpitations   No angina    She has been feeling poor for about 9 mo   Symptoms with occur throughout the day somewhat persistently rather than clear episodes     No vertigo with turning head too fast     Will do broad work up   Labs and CT scan     Consider heart work up and consulting with her cardiologist   Consider getting her ENT doc involved     Short term follow up and strict ER precautions   30+ min spent   EKG today per my read NSR           "

## 2022-04-25 ENCOUNTER — HOSPITAL ENCOUNTER (OUTPATIENT)
Dept: LAB | Facility: MEDICAL CENTER | Age: 62
End: 2022-04-25
Attending: FAMILY MEDICINE
Payer: COMMERCIAL

## 2022-04-25 DIAGNOSIS — E03.9 HYPOTHYROIDISM, UNSPECIFIED TYPE: ICD-10-CM

## 2022-04-25 DIAGNOSIS — R42 DIZZINESS: ICD-10-CM

## 2022-04-25 DIAGNOSIS — Z00.00 PREVENTATIVE HEALTH CARE: ICD-10-CM

## 2022-04-25 DIAGNOSIS — Z13.6 SCREENING FOR ISCHEMIC HEART DISEASE: ICD-10-CM

## 2022-04-25 LAB
ALBUMIN SERPL BCP-MCNC: 4.3 G/DL (ref 3.2–4.9)
ALBUMIN/GLOB SERPL: 1.8 G/DL
ALP SERPL-CCNC: 81 U/L (ref 30–99)
ALT SERPL-CCNC: 10 U/L (ref 2–50)
ANION GAP SERPL CALC-SCNC: 9 MMOL/L (ref 7–16)
AST SERPL-CCNC: 16 U/L (ref 12–45)
BASOPHILS # BLD AUTO: 0.9 % (ref 0–1.8)
BASOPHILS # BLD: 0.05 K/UL (ref 0–0.12)
BILIRUB SERPL-MCNC: 0.4 MG/DL (ref 0.1–1.5)
BUN SERPL-MCNC: 11 MG/DL (ref 8–22)
CALCIUM SERPL-MCNC: 9.6 MG/DL (ref 8.5–10.5)
CHLORIDE SERPL-SCNC: 103 MMOL/L (ref 96–112)
CHOLEST SERPL-MCNC: 251 MG/DL (ref 100–199)
CO2 SERPL-SCNC: 28 MMOL/L (ref 20–33)
CREAT SERPL-MCNC: 0.74 MG/DL (ref 0.5–1.4)
EOSINOPHIL # BLD AUTO: 0.54 K/UL (ref 0–0.51)
EOSINOPHIL NFR BLD: 9.5 % (ref 0–6.9)
ERYTHROCYTE [DISTWIDTH] IN BLOOD BY AUTOMATED COUNT: 44.6 FL (ref 35.9–50)
EST. AVERAGE GLUCOSE BLD GHB EST-MCNC: 100 MG/DL
FASTING STATUS PATIENT QL REPORTED: NORMAL
GFR SERPLBLD CREATININE-BSD FMLA CKD-EPI: 91 ML/MIN/1.73 M 2
GLOBULIN SER CALC-MCNC: 2.4 G/DL (ref 1.9–3.5)
GLUCOSE SERPL-MCNC: 87 MG/DL (ref 65–99)
HBA1C MFR BLD: 5.1 % (ref 4–5.6)
HCT VFR BLD AUTO: 46.6 % (ref 37–47)
HDLC SERPL-MCNC: 72 MG/DL
HGB BLD-MCNC: 15.4 G/DL (ref 12–16)
IMM GRANULOCYTES # BLD AUTO: 0.01 K/UL (ref 0–0.11)
IMM GRANULOCYTES NFR BLD AUTO: 0.2 % (ref 0–0.9)
LDLC SERPL CALC-MCNC: 164 MG/DL
LYMPHOCYTES # BLD AUTO: 2.1 K/UL (ref 1–4.8)
LYMPHOCYTES NFR BLD: 37 % (ref 22–41)
MCH RBC QN AUTO: 31.2 PG (ref 27–33)
MCHC RBC AUTO-ENTMCNC: 33 G/DL (ref 33.6–35)
MCV RBC AUTO: 94.5 FL (ref 81.4–97.8)
MONOCYTES # BLD AUTO: 0.49 K/UL (ref 0–0.85)
MONOCYTES NFR BLD AUTO: 8.6 % (ref 0–13.4)
NEUTROPHILS # BLD AUTO: 2.48 K/UL (ref 2–7.15)
NEUTROPHILS NFR BLD: 43.8 % (ref 44–72)
NRBC # BLD AUTO: 0 K/UL
NRBC BLD-RTO: 0 /100 WBC
PLATELET # BLD AUTO: 318 K/UL (ref 164–446)
PMV BLD AUTO: 10.1 FL (ref 9–12.9)
POTASSIUM SERPL-SCNC: 4.1 MMOL/L (ref 3.6–5.5)
PROT SERPL-MCNC: 6.7 G/DL (ref 6–8.2)
RBC # BLD AUTO: 4.93 M/UL (ref 4.2–5.4)
SODIUM SERPL-SCNC: 140 MMOL/L (ref 135–145)
TRIGL SERPL-MCNC: 77 MG/DL (ref 0–149)
TSH SERPL DL<=0.005 MIU/L-ACNC: 3.97 UIU/ML (ref 0.38–5.33)
WBC # BLD AUTO: 5.7 K/UL (ref 4.8–10.8)

## 2022-04-25 PROCEDURE — 83036 HEMOGLOBIN GLYCOSYLATED A1C: CPT

## 2022-04-25 PROCEDURE — 84443 ASSAY THYROID STIM HORMONE: CPT

## 2022-04-25 PROCEDURE — 85025 COMPLETE CBC W/AUTO DIFF WBC: CPT

## 2022-04-25 PROCEDURE — 80061 LIPID PANEL: CPT

## 2022-04-25 PROCEDURE — 36415 COLL VENOUS BLD VENIPUNCTURE: CPT

## 2022-04-25 PROCEDURE — 80053 COMPREHEN METABOLIC PANEL: CPT

## 2022-05-01 DIAGNOSIS — E03.9 HYPOTHYROIDISM, UNSPECIFIED TYPE: ICD-10-CM

## 2022-05-03 ENCOUNTER — APPOINTMENT (OUTPATIENT)
Dept: RADIOLOGY | Facility: MEDICAL CENTER | Age: 62
End: 2022-05-03
Attending: FAMILY MEDICINE
Payer: COMMERCIAL

## 2022-05-03 RX ORDER — LEVOTHYROXINE SODIUM 0.07 MG/1
TABLET ORAL
Qty: 90 TABLET | Refills: 0 | Status: SHIPPED | OUTPATIENT
Start: 2022-05-03 | End: 2022-07-22

## 2022-05-06 ENCOUNTER — HOSPITAL ENCOUNTER (OUTPATIENT)
Dept: RADIOLOGY | Facility: MEDICAL CENTER | Age: 62
End: 2022-05-06
Attending: FAMILY MEDICINE
Payer: COMMERCIAL

## 2022-05-06 DIAGNOSIS — Z13.6 SCREENING FOR ISCHEMIC HEART DISEASE: ICD-10-CM

## 2022-05-06 DIAGNOSIS — Z00.00 PREVENTATIVE HEALTH CARE: ICD-10-CM

## 2022-05-06 DIAGNOSIS — R42 DIZZINESS: ICD-10-CM

## 2022-05-06 PROCEDURE — 700117 HCHG RX CONTRAST REV CODE 255: Performed by: FAMILY MEDICINE

## 2022-05-06 PROCEDURE — 70470 CT HEAD/BRAIN W/O & W/DYE: CPT

## 2022-05-06 RX ADMIN — IOHEXOL 50 ML: 350 INJECTION, SOLUTION INTRAVENOUS at 15:10

## 2022-05-13 ENCOUNTER — OFFICE VISIT (OUTPATIENT)
Dept: MEDICAL GROUP | Facility: MEDICAL CENTER | Age: 62
End: 2022-05-13
Payer: COMMERCIAL

## 2022-05-13 VITALS
SYSTOLIC BLOOD PRESSURE: 104 MMHG | OXYGEN SATURATION: 95 % | TEMPERATURE: 97 F | DIASTOLIC BLOOD PRESSURE: 68 MMHG | BODY MASS INDEX: 21.8 KG/M2 | RESPIRATION RATE: 16 BRPM | WEIGHT: 138.89 LBS | HEART RATE: 60 BPM | HEIGHT: 67 IN

## 2022-05-13 DIAGNOSIS — R42 DIZZINESS: ICD-10-CM

## 2022-05-13 PROCEDURE — 99213 OFFICE O/P EST LOW 20 MIN: CPT | Performed by: FAMILY MEDICINE

## 2022-05-13 ASSESSMENT — FIBROSIS 4 INDEX: FIB4 SCORE: 0.99

## 2022-05-13 NOTE — ASSESSMENT & PLAN NOTE
"She does not believe that she is having vertigo symptoms   She did go to ENT and did a number of studies   BPPV was ruled out   She has follow up with ent that she plans to attend     Some orthostatic triggers and she does have appt with cardiology they have asked her to do a stress test prior     She did have a syncopal episode about 2 mo ago where she stood up from sitting and while she was walking felt pre syncopal symptoms fell and passed out and \"came to\" rather quickly , she states she feels a lot of presyncopal episodes as well     Plan : complete cardiology consultation and ENT consultation and follow up with me if symptoms worsen or fail to improve  20+ min spent           "

## 2022-05-13 NOTE — PROGRESS NOTES
This medical record contains text that has been entered with the assistance of computer voice recognition and dictation software.  Therefore, it may contain unintended errors in text, spelling, punctuation, or grammar        Chief Complaint   Patient presents with   • Follow-Up       Kalie Cook is a 61 y.o. female here evaluation and management of:     Dizziness and feeling off for 9 mo   No true vertigo   Some orthostatic triggers and symptoms       Symptoms with occur throughout the day somewhat persistently rather than clear episodes but she will have good days and bad days     We did labs and CT scan and she Is here today             Current Outpatient Medications   Medication Sig Dispense Refill   • levothyroxine (SYNTHROID) 75 MCG Tab TAKE 1 TABLET EVERY MORNINGON AN EMPTY STOMACH 90 Tablet 0   • albuterol 108 (90 Base) MCG/ACT Aero Soln inhalation aerosol      • Calcium Polycarbophil (FIBER-CAPS PO) Take 1 Tab by mouth 3 times a day.     • Cholecalciferol (VITAMIN D3 PO) Take 1 Cap by mouth every day.     • Nutritional Supplements (ESTROVEN PO) Take  by mouth. Every other day     • Mometasone Furo-Formoterol Fum 100-5 MCG/ACT Aerosol Inhale.     • budesonide (PULMICORT) 0.5 MG/2ML Suspension 500 mcg every day. Patient uses nebulizer solution in a mary jane pot like applicator with saline solution and performs a nasal irrigation twice a day     • montelukast (SINGULAIR) 10 MG Tab Take 10 mg by mouth every day.     • Multiple Vitamin (MULTI VITAMIN DAILY PO) Take 1 tablet by mouth every day.     • aspirin (ASA) 325 MG Tab Take 325 mg by mouth 2 Times a Day.       No current facility-administered medications for this visit.     Patient Active Problem List    Diagnosis Date Noted   • Dizziness 04/22/2022   • Dyslipidemia 09/14/2021   • Well woman exam with routine gynecological exam 07/21/2021   • Urge incontinence 07/21/2021   • Encounter for weight loss counseling 06/24/2020   • Mild persistent asthma  "without complication 01/04/2018   • Orthostatic hypotension 05/19/2016   • Hypothyroidism (acquired) 04/25/2016   • Depression 04/25/2016     Past Surgical History:   Procedure Laterality Date   • ABDOMINAL EXPLORATION     • PRIMARY C SECTION     • SINUSOTOMIES        Social History     Tobacco Use   • Smoking status: Never Smoker   • Smokeless tobacco: Never Used   Vaping Use   • Vaping Use: Never used   Substance Use Topics   • Alcohol use: Yes     Comment: few times a week, usually beer   • Drug use: No     Family History   Problem Relation Age of Onset   • Heart Disease Father            ROS    all review of system completed and negative except for those listed above     Objective:     /68 (BP Location: Left arm, Patient Position: Sitting, BP Cuff Size: Adult)   Pulse 60   Temp 36.1 °C (97 °F) (Temporal)   Resp 16   Ht 1.702 m (5' 7\")   Wt 63 kg (138 lb 14.2 oz)   SpO2 95%  Body mass index is 21.75 kg/m².  Physical Exam:      GEN: comfortable, alert and oriented, well nourished, well developed, in no apparent distress   HEENT: NCAT, eyes: pupils equal and reactive, sclera white, EOMIT, good dentition  HEART: limbs warm and well perfused, regular rate, no JVD, no lower extremity edema  LUNGS: speaking in full sentences, not in apparent respiratory distress, no audible wheezes  MSK: normal tone and bulk, no swelling of the joints, gait steady and normal     She did ct scan and labs which we review today         Assessment and Plan:   The following treatment plan was discussed        Problem List Items Addressed This Visit     Dizziness     She does not believe that she is having vertigo symptoms   She did go to ENT and did a number of studies   BPPV was ruled out   She has follow up with ent that she plans to attend     Some orthostatic triggers and she does have appt with cardiology they have asked her to do a stress test prior     She did have a syncopal episode about 2 mo ago where she stood up from " "sitting and while she was walking felt pre syncopal symptoms fell and passed out and \"came to\" rather quickly , she states she feels a lot of presyncopal episodes as well     Plan : complete cardiology consultation and ENT consultation and follow up with me if symptoms worsen or fail to improve  20+ min spent                                Instructed to follow up if symptoms worsen or fail to improve, ER/UC precautions discussed as well    Madeline Anderson MD  [facility], Family Medicine   [address] [place] [address]   [place] [address]  Phone: [phone]               "

## 2022-06-07 ENCOUNTER — HOSPITAL ENCOUNTER (OUTPATIENT)
Dept: CARDIOLOGY | Facility: MEDICAL CENTER | Age: 62
End: 2022-06-07
Attending: FAMILY MEDICINE
Payer: COMMERCIAL

## 2022-06-07 DIAGNOSIS — R55 SYNCOPE, UNSPECIFIED SYNCOPE TYPE: ICD-10-CM

## 2022-06-07 DIAGNOSIS — R42 DIZZINESS: ICD-10-CM

## 2022-06-07 LAB — LV EJECT FRACT  99904: 55

## 2022-06-07 PROCEDURE — 93017 CV STRESS TEST TRACING ONLY: CPT

## 2022-06-07 PROCEDURE — 93018 CV STRESS TEST I&R ONLY: CPT | Performed by: INTERNAL MEDICINE

## 2022-06-07 PROCEDURE — 93350 STRESS TTE ONLY: CPT | Mod: 26 | Performed by: INTERNAL MEDICINE

## 2022-07-22 DIAGNOSIS — E03.9 HYPOTHYROIDISM, UNSPECIFIED TYPE: ICD-10-CM

## 2022-07-22 RX ORDER — LEVOTHYROXINE SODIUM 0.07 MG/1
TABLET ORAL
Qty: 90 TABLET | Refills: 0 | Status: SHIPPED | OUTPATIENT
Start: 2022-07-22 | End: 2022-10-13

## 2022-10-12 DIAGNOSIS — E03.9 HYPOTHYROIDISM, UNSPECIFIED TYPE: ICD-10-CM

## 2022-10-13 RX ORDER — LEVOTHYROXINE SODIUM 0.07 MG/1
TABLET ORAL
Qty: 90 TABLET | Refills: 0 | Status: SHIPPED | OUTPATIENT
Start: 2022-10-13 | End: 2023-01-12

## 2023-01-10 DIAGNOSIS — E03.9 HYPOTHYROIDISM, UNSPECIFIED TYPE: ICD-10-CM

## 2023-01-12 RX ORDER — LEVOTHYROXINE SODIUM 0.07 MG/1
TABLET ORAL
Qty: 90 TABLET | Refills: 0 | Status: SHIPPED | OUTPATIENT
Start: 2023-01-12 | End: 2023-04-18

## 2023-04-15 DIAGNOSIS — E03.9 HYPOTHYROIDISM, UNSPECIFIED TYPE: ICD-10-CM

## 2023-04-18 RX ORDER — LEVOTHYROXINE SODIUM 0.07 MG/1
TABLET ORAL
Qty: 90 TABLET | Refills: 0 | Status: SHIPPED | OUTPATIENT
Start: 2023-04-18 | End: 2023-04-20 | Stop reason: SDUPTHER

## 2023-04-20 ENCOUNTER — OFFICE VISIT (OUTPATIENT)
Dept: MEDICAL GROUP | Facility: MEDICAL CENTER | Age: 63
End: 2023-04-20
Payer: COMMERCIAL

## 2023-04-20 VITALS
TEMPERATURE: 97.5 F | DIASTOLIC BLOOD PRESSURE: 62 MMHG | HEART RATE: 63 BPM | RESPIRATION RATE: 16 BRPM | WEIGHT: 138.89 LBS | SYSTOLIC BLOOD PRESSURE: 104 MMHG | BODY MASS INDEX: 21.8 KG/M2 | OXYGEN SATURATION: 98 % | HEIGHT: 67 IN

## 2023-04-20 DIAGNOSIS — Z00.00 PREVENTATIVE HEALTH CARE: ICD-10-CM

## 2023-04-20 DIAGNOSIS — R42 DIZZINESS: ICD-10-CM

## 2023-04-20 DIAGNOSIS — H93.8X3 CONGESTION OF BOTH EARS: ICD-10-CM

## 2023-04-20 DIAGNOSIS — R06.2 WHEEZING: ICD-10-CM

## 2023-04-20 DIAGNOSIS — E03.9 HYPOTHYROIDISM, UNSPECIFIED TYPE: ICD-10-CM

## 2023-04-20 PROCEDURE — 99214 OFFICE O/P EST MOD 30 MIN: CPT | Mod: 25 | Performed by: FAMILY MEDICINE

## 2023-04-20 PROCEDURE — 99396 PREV VISIT EST AGE 40-64: CPT | Performed by: FAMILY MEDICINE

## 2023-04-20 RX ORDER — LEVOTHYROXINE SODIUM 0.07 MG/1
TABLET ORAL
Qty: 90 TABLET | Refills: 1 | Status: SHIPPED | OUTPATIENT
Start: 2023-04-20 | End: 2023-04-21 | Stop reason: SDUPTHER

## 2023-04-20 ASSESSMENT — FIBROSIS 4 INDEX: FIB4 SCORE: 1

## 2023-04-20 NOTE — PROGRESS NOTES
This medical record contains text that has been entered with the assistance of computer voice recognition and dictation software.  Therefore, it may contain unintended errors in text, spelling, punctuation, or grammar        Chief Complaint   Patient presents with    Annual Exam     Medication refills  Blood work   Also discuss sinus congestion acute on chronic   Also follow up on dizziness which we discussed about a year ago     Kalie Cook is a 62 y.o. female here evaluation and management of:         Current Outpatient Medications   Medication Sig Dispense Refill    levothyroxine (SYNTHROID) 75 MCG Tab TAKE 1 TABLET EVERY MORNINGON AN EMPTY STOMACH 90 Tablet 1    albuterol 108 (90 Base) MCG/ACT Aero Soln inhalation aerosol       Calcium Polycarbophil (FIBER-CAPS PO) Take 1 Tab by mouth 3 times a day.      Cholecalciferol (VITAMIN D3 PO) Take 1 Cap by mouth every day.      Nutritional Supplements (ESTROVEN PO) Take  by mouth. Every other day      budesonide (PULMICORT) 0.5 MG/2ML Suspension 500 mcg every day. Patient uses nebulizer solution in a mary jane pot like applicator with saline solution and performs a nasal irrigation twice a day      montelukast (SINGULAIR) 10 MG Tab Take 10 mg by mouth every day.      aspirin (ASA) 325 MG Tab Take 325 mg by mouth 2 Times a Day.      Multiple Vitamin (MULTI VITAMIN DAILY PO) Take 1 tablet by mouth every day.      Mometasone Furo-Formoterol Fum 100-5 MCG/ACT Aerosol Inhale. (Patient not taking: Reported on 4/20/2023)       No current facility-administered medications for this visit.     Patient Active Problem List    Diagnosis Date Noted    Preventative health care 04/20/2023    Congestion of both ears 04/20/2023    Wheezing 04/20/2023    Dizziness 04/22/2022    Dyslipidemia 09/14/2021    Well woman exam with routine gynecological exam 07/21/2021    Urge incontinence 07/21/2021    Encounter for weight loss counseling 06/24/2020    Mild persistent asthma without  "complication 01/04/2018    Orthostatic hypotension 05/19/2016    Hypothyroidism (acquired) 04/25/2016    Depression 04/25/2016     Past Surgical History:   Procedure Laterality Date    ABDOMINAL EXPLORATION      PRIMARY C SECTION      SINUSOTOMIES        Social History     Tobacco Use    Smoking status: Never    Smokeless tobacco: Never   Vaping Use    Vaping Use: Never used   Substance Use Topics    Alcohol use: Yes     Comment: few times a week, usually beer    Drug use: No     Family History   Problem Relation Age of Onset    Heart Disease Father            ROS    all review of system completed and negative except for those listed above     Objective:     /62 (BP Location: Left arm, Patient Position: Sitting, BP Cuff Size: Adult long)   Pulse 63   Temp 36.4 °C (97.5 °F) (Temporal)   Resp 16   Ht 1.702 m (5' 7\")   Wt 63 kg (138 lb 14.2 oz)   SpO2 98%  Body mass index is 21.75 kg/m².  Physical Exam:    Constitutional: Alert, no distress.  Skin: Warm, dry, good turgor, no rashes in visible areas.  Eye: Equal, round and reactive, conjunctiva clear, lids normal.  ENMT: Lips without lesions, good dentition, oropharynx clear.  Neck: Trachea midline, no masses, no thyromegaly. No cervical or supraclavicular lymphadenopathy.  Respiratory: Unlabored respiratory effort, lungs clear to auscultation, no wheezes, no ronchi.  Cardiovascular: Normal S1, S2, no murmur, no edema.  Abdomen: Soft, non-tender, no masses, no hepatosplenomegaly.  Psych: Alert and oriented x3, normal affect and mood.        Assessment and Plan:   The following treatment plan was discussed        Problem List Items Addressed This Visit       Dizziness     Improved   She states that she followed through with all recommended studies and evaluations and specialists consultation     Consider orthostatic hypotension   I comment that her BP is excellent today   We discuss interventions/lifestyle mod in detail that can help her with her symptoms "   HIIT exercise , hydration, electrolyte, regular sleep, caffeine consistency etc               Preventative health care     Age appropriate prev health care discussed   Cancer screening discussed   Vaccines discussed   Labs offered   Blood pressure at goal     Anticipatory guidance including SPF and other skin protective measures, dental hygiene and care, regular exercise, diet, stress and family planning advice discussed.      Labs ordered medications renewed          Relevant Orders    Basic Metabolic Panel    Lipid Profile    Congestion of both ears     New problem moderate complexity/uncertain dx     She is endorsing sinus congestion and malaise  No cough   No sob   No fever   No chest pain     asculation reveals diffuse wheezes     Plan to do cXR, increase controller inh, and schedule short term follow up   I do not feel that antibiotic is indicated at this time given that she denies fever     30+ min spent   This is in addition to the prev health care visit time              Wheezing     She has h/o asthma              Relevant Orders    DX-CHEST-2 VIEWS     Other Visit Diagnoses       Hypothyroidism, unspecified type        Relevant Medications    levothyroxine (SYNTHROID) 75 MCG Tab    Other Relevant Orders    TSH                  Instructed to follow up if symptoms worsen or fail to improve, ER/UC precautions discussed as well    Madeline Anderson MD  [facility], Family Medicine   [address] [place] [address]   [place] [id number]  Phone: [phone]

## 2023-04-20 NOTE — ASSESSMENT & PLAN NOTE
New problem moderate complexity/uncertain dx     She is endorsing sinus congestion and malaise  No cough   No sob   No fever   No chest pain     asculation reveals diffuse wheezes     Plan to do cXR, increase controller inh, and schedule short term follow up   I do not feel that antibiotic is indicated at this time given that she denies fever     30+ min spent   This is in addition to the prev health care visit time

## 2023-04-20 NOTE — ASSESSMENT & PLAN NOTE
Age appropriate prev health care discussed   Cancer screening discussed   Vaccines discussed   Labs offered   Blood pressure at goal     Anticipatory guidance including SPF and other skin protective measures, dental hygiene and care, regular exercise, diet, stress and family planning advice discussed.      Labs ordered medications renewed

## 2023-04-20 NOTE — ASSESSMENT & PLAN NOTE
Improved   She states that she followed through with all recommended studies and evaluations and specialists consultation     Consider orthostatic hypotension   I comment that her BP is excellent today   We discuss interventions/lifestyle mod in detail that can help her with her symptoms   HIIT exercise , hydration, electrolyte, regular sleep, caffeine consistency etc

## 2023-04-21 ENCOUNTER — PATIENT MESSAGE (OUTPATIENT)
Dept: MEDICAL GROUP | Facility: MEDICAL CENTER | Age: 63
End: 2023-04-21
Payer: COMMERCIAL

## 2023-04-21 DIAGNOSIS — E03.9 HYPOTHYROIDISM, UNSPECIFIED TYPE: ICD-10-CM

## 2023-04-21 RX ORDER — LEVOTHYROXINE SODIUM 0.07 MG/1
TABLET ORAL
Qty: 90 TABLET | Refills: 1 | Status: SHIPPED | OUTPATIENT
Start: 2023-04-21

## 2023-05-15 ENCOUNTER — HOSPITAL ENCOUNTER (OUTPATIENT)
Dept: LAB | Facility: MEDICAL CENTER | Age: 63
End: 2023-05-15
Attending: FAMILY MEDICINE
Payer: COMMERCIAL

## 2023-05-15 DIAGNOSIS — Z00.00 PREVENTATIVE HEALTH CARE: ICD-10-CM

## 2023-05-15 DIAGNOSIS — E03.9 HYPOTHYROIDISM, UNSPECIFIED TYPE: ICD-10-CM

## 2023-05-15 LAB
ANION GAP SERPL CALC-SCNC: 12 MMOL/L (ref 7–16)
BUN SERPL-MCNC: 14 MG/DL (ref 8–22)
CALCIUM SERPL-MCNC: 9.8 MG/DL (ref 8.5–10.5)
CHLORIDE SERPL-SCNC: 100 MMOL/L (ref 96–112)
CHOLEST SERPL-MCNC: 245 MG/DL (ref 100–199)
CO2 SERPL-SCNC: 26 MMOL/L (ref 20–33)
CREAT SERPL-MCNC: 0.75 MG/DL (ref 0.5–1.4)
FASTING STATUS PATIENT QL REPORTED: NORMAL
GFR SERPLBLD CREATININE-BSD FMLA CKD-EPI: 89 ML/MIN/1.73 M 2
GLUCOSE SERPL-MCNC: 89 MG/DL (ref 65–99)
HDLC SERPL-MCNC: 79 MG/DL
LDLC SERPL CALC-MCNC: 147 MG/DL
POTASSIUM SERPL-SCNC: 4.3 MMOL/L (ref 3.6–5.5)
SODIUM SERPL-SCNC: 138 MMOL/L (ref 135–145)
TRIGL SERPL-MCNC: 97 MG/DL (ref 0–149)
TSH SERPL DL<=0.005 MIU/L-ACNC: 2.12 UIU/ML (ref 0.38–5.33)

## 2023-05-15 PROCEDURE — 80061 LIPID PANEL: CPT

## 2023-05-15 PROCEDURE — 36415 COLL VENOUS BLD VENIPUNCTURE: CPT

## 2023-05-15 PROCEDURE — 84443 ASSAY THYROID STIM HORMONE: CPT

## 2023-05-15 PROCEDURE — 80048 BASIC METABOLIC PNL TOTAL CA: CPT

## 2023-05-18 ENCOUNTER — OFFICE VISIT (OUTPATIENT)
Dept: MEDICAL GROUP | Facility: MEDICAL CENTER | Age: 63
End: 2023-05-18
Payer: COMMERCIAL

## 2023-05-18 VITALS
BODY MASS INDEX: 21.45 KG/M2 | HEART RATE: 74 BPM | TEMPERATURE: 97.6 F | RESPIRATION RATE: 20 BRPM | HEIGHT: 67 IN | WEIGHT: 136.69 LBS | SYSTOLIC BLOOD PRESSURE: 112 MMHG | OXYGEN SATURATION: 94 % | DIASTOLIC BLOOD PRESSURE: 60 MMHG

## 2023-05-18 DIAGNOSIS — E78.5 DYSLIPIDEMIA: ICD-10-CM

## 2023-05-18 DIAGNOSIS — Z00.00 PREVENTATIVE HEALTH CARE: ICD-10-CM

## 2023-05-18 DIAGNOSIS — Z12.31 ENCOUNTER FOR SCREENING MAMMOGRAM FOR BREAST CANCER: ICD-10-CM

## 2023-05-18 DIAGNOSIS — E03.9 HYPOTHYROIDISM (ACQUIRED): ICD-10-CM

## 2023-05-18 DIAGNOSIS — R06.2 WHEEZING: ICD-10-CM

## 2023-05-18 PROCEDURE — 3078F DIAST BP <80 MM HG: CPT | Performed by: FAMILY MEDICINE

## 2023-05-18 PROCEDURE — 99214 OFFICE O/P EST MOD 30 MIN: CPT | Performed by: FAMILY MEDICINE

## 2023-05-18 PROCEDURE — 3074F SYST BP LT 130 MM HG: CPT | Performed by: FAMILY MEDICINE

## 2023-05-18 ASSESSMENT — PATIENT HEALTH QUESTIONNAIRE - PHQ9: CLINICAL INTERPRETATION OF PHQ2 SCORE: 0

## 2023-05-18 ASSESSMENT — FIBROSIS 4 INDEX: FIB4 SCORE: 1

## 2023-05-18 NOTE — ASSESSMENT & PLAN NOTE
Last visit we observed some wheezing which was shortly after a viral URI   We offered CXR but she opted for watchful waiting instead   And at that Time I recommended she increase her inh     She feels well   auscultative exam today clear

## 2023-05-18 NOTE — PROGRESS NOTES
This medical record contains text that has been entered with the assistance of computer voice recognition and dictation software.  Therefore, it may contain unintended errors in text, spelling, punctuation, or grammar        Chief Complaint   Patient presents with    Results     PT wants her lungs listened to, due to rattle last ov.   Pt denies cough, chest congestion, breathing problems or wheezing.       Kalie Cook is a 62 y.o. female here evaluation and management of:      Current Outpatient Medications   Medication Sig Dispense Refill    levothyroxine (SYNTHROID) 75 MCG Tab TAKE 1 TABLET EVERY MORNINGON AN EMPTY STOMACH 90 Tablet 1    albuterol 108 (90 Base) MCG/ACT Aero Soln inhalation aerosol       Cholecalciferol (VITAMIN D3 PO) Take 1 Cap by mouth every day.      Nutritional Supplements (ESTROVEN PO) Take  by mouth. Every other day      budesonide (PULMICORT) 0.5 MG/2ML Suspension 500 mcg every day. Patient uses nebulizer solution in a mary jane pot like applicator with saline solution and performs a nasal irrigation twice a day      montelukast (SINGULAIR) 10 MG Tab Take 10 mg by mouth every day.      aspirin (ASA) 325 MG Tab Take 325 mg by mouth 2 Times a Day.      Multiple Vitamin (MULTI VITAMIN DAILY PO) Take 1 tablet by mouth every day.      Mometasone Furo-Formoterol Fum 100-5 MCG/ACT Aerosol Inhale. (Patient not taking: Reported on 4/20/2023)       No current facility-administered medications for this visit.     Patient Active Problem List    Diagnosis Date Noted    Preventative health care 04/20/2023    Congestion of both ears 04/20/2023    Wheezing 04/20/2023    Dizziness 04/22/2022    Dyslipidemia 09/14/2021    Well woman exam with routine gynecological exam 07/21/2021    Urge incontinence 07/21/2021    Encounter for weight loss counseling 06/24/2020    Mild persistent asthma without complication 01/04/2018    Orthostatic hypotension 05/19/2016    Hypothyroidism (acquired) 04/25/2016     "Depression 04/25/2016     Past Surgical History:   Procedure Laterality Date    ABDOMINAL EXPLORATION      PRIMARY C SECTION      SINUSOTOMIES        Social History     Tobacco Use    Smoking status: Never    Smokeless tobacco: Never   Vaping Use    Vaping Use: Never used   Substance Use Topics    Alcohol use: Yes     Comment: few times a week, usually beer    Drug use: No     Family History   Problem Relation Age of Onset    Heart Disease Father            ROS    all review of system completed and negative except for those listed above     Objective:     /60 (BP Location: Left arm, Patient Position: Sitting, BP Cuff Size: Adult long)   Pulse 74   Temp 36.4 °C (97.6 °F) (Temporal)   Resp 20   Ht 1.702 m (5' 7\")   Wt 62 kg (136 lb 11 oz)   SpO2 94%  Body mass index is 21.41 kg/m².  Physical Exam:        GEN: comfortable, alert and oriented, well nourished, well developed, in no apparent distress   HEENT: NCAT, eyes: pupils equal and reactive, sclera white, EOMIT, good dentition  HEART: limbs warm and well perfused, regular rate, no JVD, no lower extremity edema  LUNGS: speaking in full sentences, not in apparent respiratory distress, no audible wheezes  MSK: normal tone and bulk, no swelling of the joints, gait steady and normal           Assessment and Plan:   The following treatment plan was discussed        Problem List Items Addressed This Visit       Hypothyroidism (acquired)     Euthyroid     Labs for next years prev exam placed                Dyslipidemia     Improved from prior   Still high   Diet/nutrition discussed at length                Preventative health care    Relevant Orders    Comp Metabolic Panel    Lipid Profile    TSH    Wheezing     Last visit we observed some wheezing which was shortly after a viral URI   We offered CXR but she opted for watchful waiting instead   And at that Time I recommended she increase her inh     She feels well   auscultative exam today clear             "     Other Visit Diagnoses       Encounter for screening mammogram for breast cancer        Relevant Orders    MA-SCREENING MAMMO BILAT W/CAD                  Instructed to follow up if symptoms worsen or fail to improve, ER/UC precautions discussed as well    Madeline Anderson MD  [facility], Family [address]   [place] [id number]  Phone: [phone]

## 2023-06-13 ENCOUNTER — HOSPITAL ENCOUNTER (OUTPATIENT)
Dept: RADIOLOGY | Facility: MEDICAL CENTER | Age: 63
End: 2023-06-13
Attending: FAMILY MEDICINE
Payer: COMMERCIAL

## 2023-06-13 DIAGNOSIS — Z12.31 ENCOUNTER FOR SCREENING MAMMOGRAM FOR BREAST CANCER: ICD-10-CM

## 2023-06-13 PROCEDURE — 77063 BREAST TOMOSYNTHESIS BI: CPT

## 2024-02-27 ENCOUNTER — OFFICE VISIT (OUTPATIENT)
Dept: MEDICAL GROUP | Facility: MEDICAL CENTER | Age: 64
End: 2024-02-27
Payer: COMMERCIAL

## 2024-02-27 VITALS
TEMPERATURE: 97.8 F | DIASTOLIC BLOOD PRESSURE: 62 MMHG | HEIGHT: 67 IN | SYSTOLIC BLOOD PRESSURE: 104 MMHG | OXYGEN SATURATION: 97 % | HEART RATE: 82 BPM | RESPIRATION RATE: 18 BRPM | WEIGHT: 133.93 LBS | BODY MASS INDEX: 21.02 KG/M2

## 2024-02-27 DIAGNOSIS — J45.30 MILD PERSISTENT ASTHMA WITHOUT COMPLICATION: ICD-10-CM

## 2024-02-27 DIAGNOSIS — Z23 NEED FOR VACCINATION: ICD-10-CM

## 2024-02-27 DIAGNOSIS — T78.40XS ALLERGY, SEQUELA: ICD-10-CM

## 2024-02-27 DIAGNOSIS — J32.9 SINUSITIS, UNSPECIFIED CHRONICITY, UNSPECIFIED LOCATION: ICD-10-CM

## 2024-02-27 PROCEDURE — 90471 IMMUNIZATION ADMIN: CPT | Performed by: FAMILY MEDICINE

## 2024-02-27 PROCEDURE — 3074F SYST BP LT 130 MM HG: CPT | Performed by: FAMILY MEDICINE

## 2024-02-27 PROCEDURE — 90677 PCV20 VACCINE IM: CPT | Performed by: FAMILY MEDICINE

## 2024-02-27 PROCEDURE — 99214 OFFICE O/P EST MOD 30 MIN: CPT | Mod: 25 | Performed by: FAMILY MEDICINE

## 2024-02-27 PROCEDURE — 3078F DIAST BP <80 MM HG: CPT | Performed by: FAMILY MEDICINE

## 2024-02-27 ASSESSMENT — PATIENT HEALTH QUESTIONNAIRE - PHQ9: CLINICAL INTERPRETATION OF PHQ2 SCORE: 0

## 2024-02-27 ASSESSMENT — FIBROSIS 4 INDEX: FIB4 SCORE: 1.02

## 2024-02-27 NOTE — ASSESSMENT & PLAN NOTE
Allergies   Asthma   Sinusitis  Overall well controlled   She sees allergist in dr Kramer office   She see's them 1x per year and gets controller medications refilled   She has exacerbation plan and typically calls in to the office to get advice     She is wondering if she should establish with [facility]     I suggest that she continue her relationship with allergy but now call and inquire about same day access   We also discuss the possibility of utilizing urgent care     30+ min spent

## 2024-02-27 NOTE — PROGRESS NOTES
This medical record contains text that has been entered with the assistance of computer voice recognition and dictation software.  Therefore, it may contain unintended errors in text, spelling, punctuation, or grammar        Chief Complaint   Patient presents with    Referral Needed     HX: Asthma since 18 mo old.  [facility] dropped a few days ago due to had a URI  Had a Pulmonologist and ENT due to sinus inf and polyps, underwent surgery. Pt opt to stay with the ENT because they used to see her for her asthma. Pt is seeing the Allergist but he's not as accessible.        Kalie Cook is a 63 y.o. female here evaluation and management of:   Current Outpatient Medications   Medication Sig Dispense Refill    mupirocin (BACTROBAN) 2 % Ointment Apply 1 Application topically 2 times a day.      levothyroxine (SYNTHROID) 75 MCG Tab TAKE 1 TABLET EVERY MORNINGON AN EMPTY STOMACH 90 Tablet 1    albuterol 108 (90 Base) MCG/ACT Aero Soln inhalation aerosol       Cholecalciferol (VITAMIN D3 PO) Take 1 Cap by mouth every day.      Nutritional Supplements (ESTROVEN PO) Take  by mouth. Every other day      budesonide (PULMICORT) 0.5 MG/2ML Suspension 500 mcg every day. Patient uses nebulizer solution in a mary jane pot like applicator with saline solution and performs a nasal irrigation twice a day      montelukast (SINGULAIR) 10 MG Tab Take 10 mg by mouth every day.      aspirin (ASA) 325 MG Tab Take 325 mg by mouth 2 Times a Day.      Multiple Vitamin (MULTI VITAMIN DAILY PO) Take 1 tablet by mouth every day.      Mometasone Furo-Formoterol Fum 100-5 MCG/ACT Aerosol Inhale. (Patient not taking: Reported on 4/20/2023)       No current facility-administered medications for this visit.     Patient Active Problem List    Diagnosis Date Noted    Preventative health care 04/20/2023    Congestion of both ears 04/20/2023    Wheezing 04/20/2023    Dizziness 04/22/2022    Dyslipidemia 09/14/2021    Well woman exam with routine  "gynecological exam 07/21/2021    Urge incontinence 07/21/2021    Encounter for weight loss counseling 06/24/2020    Mild persistent asthma without complication 01/04/2018    Orthostatic hypotension 05/19/2016    Hypothyroidism (acquired) 04/25/2016    Depression 04/25/2016     Past Surgical History:   Procedure Laterality Date    ABDOMINAL EXPLORATION      PRIMARY C SECTION      SINUSOTOMIES        Social History     Tobacco Use    Smoking status: Never    Smokeless tobacco: Never   Vaping Use    Vaping Use: Never used   Substance Use Topics    Alcohol use: Yes     Comment: few times a week, usually beer    Drug use: No     Family History   Problem Relation Age of Onset    Heart Disease Father            ROS    all review of system completed and negative except for those listed above     Objective:     /62 (BP Location: Left arm, Patient Position: Sitting, BP Cuff Size: Adult)   Pulse 82   Temp 36.6 °C (97.8 °F) (Temporal)   Resp 18   Ht 1.702 m (5' 7\")   Wt 60.7 kg (133 lb 14.9 oz)   SpO2 97%  Body mass index is 20.98 kg/m².  Physical Exam:        GEN: comfortable, alert and oriented, well nourished, well developed, in no apparent distress   HEENT: NCAT, eyes: pupils equal and reactive, sclera white, EOMIT, good dentition  HEART: limbs warm and well perfused, regular rate, no JVD, no lower extremity edema  LUNGS: speaking in full sentences, not in apparent respiratory distress, no audible wheezes  MSK: normal tone and bulk, no swelling of the joints, gait steady and normal           Assessment and Plan:   The following treatment plan was discussed        Problem List Items Addressed This Visit       Mild persistent asthma without complication     Allergies   Asthma   Sinusitis  Overall well controlled   She sees allergist in dr Kramer office   She see's them 1x per year and gets controller medications refilled   She has exacerbation plan and typically calls in to the office to get advice     She is " wondering if she should establish with [facility]     I suggest that she continue her relationship with allergy but now call and inquire about same day access   We also discuss the possibility of utilizing urgent care     30+ min spent          Relevant Orders    Referral to Pulmonary and Sleep Medicine    PULMONARY FUNCTION TESTS -Test requested: Complete Pulmonary Function Test; Include MIPS/MEPS? Yes     Other Visit Diagnoses       Need for vaccination        Relevant Orders    Pneumococcal Conjugate Vaccine 20-Valent (6 wks+) (Completed)    Allergy, sequela        Relevant Orders    Referral to Allergy    Sinusitis, unspecified chronicity, unspecified location        Relevant Orders    Referral to ENT                  Instructed to follow up if symptoms worsen or fail to improve, ER/UC precautions discussed as well    MD [facility], Family Medicine   [address] [place] [address] [address]   [place] [id number]  Phone: [phone]

## 2024-03-19 ENCOUNTER — OFFICE VISIT (OUTPATIENT)
Dept: URGENT CARE | Facility: CLINIC | Age: 64
End: 2024-03-19
Payer: COMMERCIAL

## 2024-03-19 VITALS
RESPIRATION RATE: 14 BRPM | BODY MASS INDEX: 21.19 KG/M2 | WEIGHT: 135 LBS | HEIGHT: 67 IN | SYSTOLIC BLOOD PRESSURE: 120 MMHG | HEART RATE: 77 BPM | TEMPERATURE: 97.6 F | DIASTOLIC BLOOD PRESSURE: 70 MMHG | OXYGEN SATURATION: 98 %

## 2024-03-19 DIAGNOSIS — N63.12 MASS OF UPPER INNER QUADRANT OF RIGHT BREAST: ICD-10-CM

## 2024-03-19 PROCEDURE — 99214 OFFICE O/P EST MOD 30 MIN: CPT | Performed by: FAMILY MEDICINE

## 2024-03-19 PROCEDURE — 3074F SYST BP LT 130 MM HG: CPT | Performed by: FAMILY MEDICINE

## 2024-03-19 PROCEDURE — 3078F DIAST BP <80 MM HG: CPT | Performed by: FAMILY MEDICINE

## 2024-03-19 RX ORDER — BUDESONIDE AND FORMOTEROL FUMARATE DIHYDRATE 160; 4.5 UG/1; UG/1
AEROSOL RESPIRATORY (INHALATION)
COMMUNITY
Start: 2024-03-11

## 2024-03-19 ASSESSMENT — FIBROSIS 4 INDEX: FIB4 SCORE: 1.02

## 2024-03-19 ASSESSMENT — ENCOUNTER SYMPTOMS
CARDIOVASCULAR NEGATIVE: 1
CONSTITUTIONAL NEGATIVE: 1
RESPIRATORY NEGATIVE: 1
GASTROINTESTINAL NEGATIVE: 1
EYES NEGATIVE: 1

## 2024-03-19 NOTE — PROGRESS NOTES
"Subjective:   Kalie Cook is a 63 y.o. female who presents for Lump (Lump above right breast x 1 day)      Patient notice a soft, mobil lump in right breast, had neg mammogram less than one year ago        Review of Systems   Constitutional: Negative.    Eyes: Negative.    Respiratory: Negative.     Cardiovascular: Negative.    Gastrointestinal: Negative.    Genitourinary: Negative.    Skin: Negative.        Medications, Allergies, and current problem list reviewed today in Epic.     Objective:     /70 (BP Location: Left arm, Patient Position: Sitting, BP Cuff Size: Adult)   Pulse 77   Temp 36.4 °C (97.6 °F) (Temporal)   Resp 14   Ht 1.702 m (5' 7\")   Wt 61.2 kg (135 lb)   SpO2 98%     Physical Exam  Vitals and nursing note reviewed.   Constitutional:       Appearance: Normal appearance.   Skin:     Comments: Breast lump 2 cm, raised, mobil, nontender, right upper medial breast   Neurological:      Mental Status: She is alert.         Assessment/Plan:     Diagnosis and associated orders:     1. Mass of upper inner quadrant of right breast  US-BREAST LIMITED-RIGHT         Comments/MDM:              Differential diagnosis, natural history, supportive care, and indications for immediate follow-up discussed.    Advised the patient to follow-up with the primary care physician for recheck, reevaluation, and consideration of further management.    Please note that this dictation was created using voice recognition software. I have made a reasonable attempt to correct obvious errors, but I expect that there are errors of grammar and possibly content that I did not discover before finalizing the note.    This note was electronically signed by Gilson Ta M.D.  "

## 2024-03-28 ENCOUNTER — TELEPHONE (OUTPATIENT)
Dept: HEALTH INFORMATION MANAGEMENT | Facility: OTHER | Age: 64
End: 2024-03-28

## 2024-04-05 ENCOUNTER — HOSPITAL ENCOUNTER (OUTPATIENT)
Dept: RADIOLOGY | Facility: MEDICAL CENTER | Age: 64
End: 2024-04-05
Attending: FAMILY MEDICINE
Payer: COMMERCIAL

## 2024-04-05 DIAGNOSIS — N63.12 MASS OF UPPER INNER QUADRANT OF RIGHT BREAST: ICD-10-CM

## 2024-04-05 PROCEDURE — 76642 ULTRASOUND BREAST LIMITED: CPT | Mod: RT

## 2024-04-05 PROCEDURE — G0279 TOMOSYNTHESIS, MAMMO: HCPCS

## 2024-05-03 DIAGNOSIS — E03.9 HYPOTHYROIDISM, UNSPECIFIED TYPE: ICD-10-CM

## 2024-05-07 RX ORDER — LEVOTHYROXINE SODIUM 0.07 MG/1
TABLET ORAL
Qty: 90 TABLET | Refills: 1 | Status: SHIPPED | OUTPATIENT
Start: 2024-05-07 | End: 2024-05-13 | Stop reason: SDUPTHER

## 2024-05-13 ENCOUNTER — PATIENT MESSAGE (OUTPATIENT)
Dept: MEDICAL GROUP | Facility: MEDICAL CENTER | Age: 64
End: 2024-05-13
Payer: COMMERCIAL

## 2024-05-13 DIAGNOSIS — E03.9 HYPOTHYROIDISM, UNSPECIFIED TYPE: ICD-10-CM

## 2024-05-14 ENCOUNTER — HOSPITAL ENCOUNTER (OUTPATIENT)
Dept: LAB | Facility: MEDICAL CENTER | Age: 64
End: 2024-05-14
Attending: FAMILY MEDICINE
Payer: COMMERCIAL

## 2024-05-14 DIAGNOSIS — Z00.00 PREVENTATIVE HEALTH CARE: ICD-10-CM

## 2024-05-14 LAB
ALBUMIN SERPL BCP-MCNC: 4.3 G/DL (ref 3.2–4.9)
ALBUMIN/GLOB SERPL: 1.8 G/DL
ALP SERPL-CCNC: 79 U/L (ref 30–99)
ALT SERPL-CCNC: 8 U/L (ref 2–50)
ANION GAP SERPL CALC-SCNC: 11 MMOL/L (ref 7–16)
AST SERPL-CCNC: 21 U/L (ref 12–45)
BILIRUB SERPL-MCNC: 0.4 MG/DL (ref 0.1–1.5)
BUN SERPL-MCNC: 13 MG/DL (ref 8–22)
CALCIUM ALBUM COR SERPL-MCNC: 9.3 MG/DL (ref 8.5–10.5)
CALCIUM SERPL-MCNC: 9.5 MG/DL (ref 8.5–10.5)
CHLORIDE SERPL-SCNC: 107 MMOL/L (ref 96–112)
CHOLEST SERPL-MCNC: 222 MG/DL (ref 100–199)
CO2 SERPL-SCNC: 25 MMOL/L (ref 20–33)
CREAT SERPL-MCNC: 0.71 MG/DL (ref 0.5–1.4)
FASTING STATUS PATIENT QL REPORTED: NORMAL
GFR SERPLBLD CREATININE-BSD FMLA CKD-EPI: 95 ML/MIN/1.73 M 2
GLOBULIN SER CALC-MCNC: 2.4 G/DL (ref 1.9–3.5)
GLUCOSE SERPL-MCNC: 87 MG/DL (ref 65–99)
HDLC SERPL-MCNC: 77 MG/DL
LDLC SERPL CALC-MCNC: 133 MG/DL
POTASSIUM SERPL-SCNC: 4.9 MMOL/L (ref 3.6–5.5)
PROT SERPL-MCNC: 6.7 G/DL (ref 6–8.2)
SODIUM SERPL-SCNC: 143 MMOL/L (ref 135–145)
TRIGL SERPL-MCNC: 61 MG/DL (ref 0–149)
TSH SERPL DL<=0.005 MIU/L-ACNC: 3.05 UIU/ML (ref 0.38–5.33)

## 2024-05-14 RX ORDER — LEVOTHYROXINE SODIUM 0.07 MG/1
TABLET ORAL
Qty: 90 TABLET | Refills: 1 | Status: SHIPPED | OUTPATIENT
Start: 2024-05-14

## 2024-05-21 ENCOUNTER — OFFICE VISIT (OUTPATIENT)
Dept: MEDICAL GROUP | Facility: MEDICAL CENTER | Age: 64
End: 2024-05-21
Payer: COMMERCIAL

## 2024-05-21 VITALS
WEIGHT: 132.28 LBS | HEIGHT: 67 IN | DIASTOLIC BLOOD PRESSURE: 70 MMHG | BODY MASS INDEX: 20.76 KG/M2 | OXYGEN SATURATION: 96 % | HEART RATE: 83 BPM | SYSTOLIC BLOOD PRESSURE: 118 MMHG | TEMPERATURE: 97.4 F

## 2024-05-21 DIAGNOSIS — N63.0 MASS OF BREAST, UNSPECIFIED LATERALITY: ICD-10-CM

## 2024-05-21 DIAGNOSIS — Z13.1 SCREENING FOR DIABETES MELLITUS (DM): ICD-10-CM

## 2024-05-21 DIAGNOSIS — Z00.00 PREVENTATIVE HEALTH CARE: ICD-10-CM

## 2024-05-21 DIAGNOSIS — E03.9 HYPOTHYROIDISM (ACQUIRED): ICD-10-CM

## 2024-05-21 DIAGNOSIS — Z13.6 SCREENING FOR ISCHEMIC HEART DISEASE: ICD-10-CM

## 2024-05-21 DIAGNOSIS — N39.41 URGE INCONTINENCE: ICD-10-CM

## 2024-05-21 DIAGNOSIS — I95.1 ORTHOSTATIC HYPOTENSION: ICD-10-CM

## 2024-05-21 PROCEDURE — 3078F DIAST BP <80 MM HG: CPT | Performed by: FAMILY MEDICINE

## 2024-05-21 PROCEDURE — 3074F SYST BP LT 130 MM HG: CPT | Performed by: FAMILY MEDICINE

## 2024-05-21 PROCEDURE — 99213 OFFICE O/P EST LOW 20 MIN: CPT | Performed by: FAMILY MEDICINE

## 2024-05-21 NOTE — ASSESSMENT & PLAN NOTE
We discuss pelvic floor health in detail   Plan to hydrate appropriately and schedule void approx every 2 hours   To avoid bladder distension  Options for pelvic floor Pt and urogyn consultation can be considered

## 2024-05-21 NOTE — PROGRESS NOTES
This medical record contains text that has been entered with the assistance of computer voice recognition and dictation software.  Therefore, it may contain unintended errors in text, spelling, punctuation, or grammar        Chief Complaint   Patient presents with    Annual Exam       Kalie Cook is a 63 y.o. female here evaluation and management of:     Current Outpatient Medications   Medication Sig Dispense Refill    levothyroxine (SYNTHROID) 75 MCG Tab TAKE 1 TABLET EVERY MORNINGON AN EMPTY STOMACH 90 Tablet 1    budesonide-formoterol (SYMBICORT) 160-4.5 MCG/ACT Aerosol       mupirocin (BACTROBAN) 2 % Ointment Apply 1 Application topically 2 times a day.      albuterol 108 (90 Base) MCG/ACT Aero Soln inhalation aerosol       Cholecalciferol (VITAMIN D3 PO) Take 1 Cap by mouth every day.      Nutritional Supplements (ESTROVEN PO) Take  by mouth. Every other day      budesonide (PULMICORT) 0.5 MG/2ML Suspension 500 mcg every day. Patient uses nebulizer solution in a mary jane pot like applicator with saline solution and performs a nasal irrigation twice a day      montelukast (SINGULAIR) 10 MG Tab Take 10 mg by mouth every day.      aspirin (ASA) 325 MG Tab Take 325 mg by mouth 2 Times a Day.      Multiple Vitamin (MULTI VITAMIN DAILY PO) Take 1 tablet by mouth every day.      Mometasone Furo-Formoterol Fum 100-5 MCG/ACT Aerosol Inhale. (Patient not taking: Reported on 4/20/2023)       No current facility-administered medications for this visit.     Patient Active Problem List    Diagnosis Date Noted    Mass of breast 05/21/2024    Preventative health care 04/20/2023    Congestion of both ears 04/20/2023    Wheezing 04/20/2023    Dizziness 04/22/2022    Dyslipidemia 09/14/2021    Well woman exam with routine gynecological exam 07/21/2021    Urge incontinence 07/21/2021    Encounter for weight loss counseling 06/24/2020    Mild persistent asthma without complication 01/04/2018    Orthostatic hypotension  "05/19/2016    Hypothyroidism (acquired) 04/25/2016    Depression 04/25/2016     Past Surgical History:   Procedure Laterality Date    ABDOMINAL EXPLORATION      PRIMARY C SECTION      SINUSOTOMIES        Social History     Tobacco Use    Smoking status: Never    Smokeless tobacco: Never   Vaping Use    Vaping status: Never Used   Substance Use Topics    Alcohol use: Yes     Comment: occ.    Drug use: No     Family History   Problem Relation Age of Onset    Heart Disease Father            ROS    all review of system completed and negative except for those listed above     Objective:     /70 (BP Location: Left arm, Patient Position: Sitting, BP Cuff Size: Adult)   Pulse 83   Temp 36.3 °C (97.4 °F) (Temporal)   Ht 1.702 m (5' 7\")   Wt 60 kg (132 lb 4.4 oz)   SpO2 96%  Body mass index is 20.72 kg/m².  Physical Exam:        GEN: comfortable, alert and oriented, well nourished, well developed, in no apparent distress   HEENT: NCAT, eyes: pupils equal and reactive, sclera white, EOMIT, good dentition  HEART: limbs warm and well perfused, regular rate, no JVD, no lower extremity edema  LUNGS: speaking in full sentences, not in apparent respiratory distress, no audible wheezes  MSK: normal tone and bulk, no swelling of the joints, gait steady and normal           Assessment and Plan:   The following treatment plan was discussed        Problem List Items Addressed This Visit       Hypothyroidism (acquired)    Relevant Orders    TSH    Lipid Profile    Basic Metabolic Panel    Orthostatic hypotension     Strategies for preventing symptoms of this discussed           Urge incontinence     We discuss pelvic floor health in detail   Plan to hydrate appropriately and schedule void approx every 2 hours   To avoid bladder distension  Options for pelvic floor Pt and urogyn consultation can be considered              Preventative health care     Age appropriate prev health care discussed   Cancer screening discussed "   Vaccines discussed   Labs offered   Blood pressure at goal     Anticipatory guidance including SPF and other skin protective measures, dental hygiene and care, regular exercise, diet, stress and family planning advice discussed.             Mass of breast     We review imaging which is very reassuring   She no longer feels the lump   But I do offer option for breast surgery consultation if she develops the concerns in the future             Relevant Orders    Referral to Breast Surgery Oncology     Other Visit Diagnoses       Screening for diabetes mellitus (DM)        Relevant Orders    TSH    Lipid Profile    Basic Metabolic Panel    Screening for ischemic heart disease        Relevant Orders    TSH    Lipid Profile    Basic Metabolic Panel                  Instructed to follow up if symptoms worsen or fail to improve, ER/UC precautions discussed as well    Madeline Anderson MD  [facility], Family Medicine   [address] [place] [address] [address]   [place] [id number]  Phone: [phone]

## 2024-05-21 NOTE — ASSESSMENT & PLAN NOTE
We review imaging which is very reassuring   She no longer feels the lump   But I do offer option for breast surgery consultation if she develops the concerns in the future

## 2024-06-30 ENCOUNTER — HOSPITAL ENCOUNTER (OUTPATIENT)
Facility: MEDICAL CENTER | Age: 64
End: 2024-06-30
Payer: COMMERCIAL

## 2024-06-30 ENCOUNTER — OFFICE VISIT (OUTPATIENT)
Dept: URGENT CARE | Facility: CLINIC | Age: 64
End: 2024-06-30
Payer: COMMERCIAL

## 2024-06-30 VITALS
OXYGEN SATURATION: 99 % | RESPIRATION RATE: 16 BRPM | WEIGHT: 135 LBS | HEART RATE: 77 BPM | TEMPERATURE: 97 F | SYSTOLIC BLOOD PRESSURE: 112 MMHG | HEIGHT: 67 IN | DIASTOLIC BLOOD PRESSURE: 74 MMHG | BODY MASS INDEX: 21.19 KG/M2

## 2024-06-30 DIAGNOSIS — R30.0 DYSURIA: ICD-10-CM

## 2024-06-30 DIAGNOSIS — N30.00 ACUTE CYSTITIS WITHOUT HEMATURIA: ICD-10-CM

## 2024-06-30 DIAGNOSIS — R09.81 NASAL CONGESTION: ICD-10-CM

## 2024-06-30 LAB
APPEARANCE UR: CLEAR
BILIRUB UR STRIP-MCNC: NEGATIVE MG/DL
COLOR UR AUTO: NORMAL
GLUCOSE UR STRIP.AUTO-MCNC: NEGATIVE MG/DL
KETONES UR STRIP.AUTO-MCNC: NEGATIVE MG/DL
LEUKOCYTE ESTERASE UR QL STRIP.AUTO: NORMAL
NITRITE UR QL STRIP.AUTO: NEGATIVE
PH UR STRIP.AUTO: 7.5 [PH] (ref 5–8)
PROT UR QL STRIP: NEGATIVE MG/DL
RBC UR QL AUTO: NORMAL
SP GR UR STRIP.AUTO: 1.01
UROBILINOGEN UR STRIP-MCNC: 0.2 MG/DL

## 2024-06-30 PROCEDURE — 87077 CULTURE AEROBIC IDENTIFY: CPT

## 2024-06-30 PROCEDURE — 87186 SC STD MICRODIL/AGAR DIL: CPT

## 2024-06-30 PROCEDURE — 87086 URINE CULTURE/COLONY COUNT: CPT

## 2024-06-30 RX ORDER — CEFDINIR 300 MG/1
300 CAPSULE ORAL 2 TIMES DAILY
Qty: 10 CAPSULE | Refills: 0 | Status: SHIPPED | OUTPATIENT
Start: 2024-06-30 | End: 2024-07-05

## 2024-06-30 ASSESSMENT — ENCOUNTER SYMPTOMS
SHORTNESS OF BREATH: 0
BACK PAIN: 0
DIZZINESS: 0
VOMITING: 0
HEADACHES: 0
NECK PAIN: 0
MYALGIAS: 0
ABDOMINAL PAIN: 0
FEVER: 0
DIARRHEA: 0
BLOOD IN STOOL: 0
STRIDOR: 0
CHILLS: 0
NAUSEA: 0
FLANK PAIN: 0
WEAKNESS: 0

## 2024-06-30 ASSESSMENT — VISUAL ACUITY: OU: 1

## 2024-06-30 NOTE — PROGRESS NOTES
Subjective     Kalie Lainey Cook is a 63 y.o. female who presents with urinary discomfort and frequency x3 days.     HPI:   Kalie is a 64yo female presenting for urinary discomfort and frequency x3 days. Reports intermittent dull aching in flank region bilaterally, however reports this has resolved. Denies abnormal vaginal discharge or itching. No flank pain, denies fevers/chills. BMs regular. Denies hematuria. No fever, shortness of breath, or vomiting. Denies abdominal pain. Patient also reports nasal congestion x3 days. No URI symptoms. Denies sinus pain or headache. Denies cough or current exacerbation of underlying asthma.            Review of Systems   Constitutional:  Negative for chills, fever and malaise/fatigue.   Respiratory:  Negative for shortness of breath and stridor.    Gastrointestinal:  Negative for abdominal pain, blood in stool, diarrhea, melena, nausea and vomiting.   Genitourinary:  Positive for dysuria, frequency and urgency. Negative for flank pain and hematuria.   Musculoskeletal:  Negative for back pain, myalgias and neck pain.   Neurological:  Negative for dizziness, weakness and headaches.     Past Medical History:   Diagnosis Date    Asthma       Past Surgical History:   Procedure Laterality Date    ABDOMINAL EXPLORATION      PRIMARY C SECTION      SINUSOTOMIES        Allergies: Sulfa drugs     Current Outpatient Medications:     levothyroxine (SYNTHROID) 75 MCG Tab, TAKE 1 TABLET EVERY MORNINGON AN EMPTY STOMACH, Disp: 90 Tablet, Rfl: 1    budesonide-formoterol (SYMBICORT) 160-4.5 MCG/ACT Aerosol, , Disp: , Rfl:     mupirocin (BACTROBAN) 2 % Ointment, Apply 1 Application topically 2 times a day., Disp: , Rfl:     albuterol 108 (90 Base) MCG/ACT Aero Soln inhalation aerosol, , Disp: , Rfl:     Cholecalciferol (VITAMIN D3 PO), Take 1 Cap by mouth every day., Disp: , Rfl:     Nutritional Supplements (ESTROVEN PO), Take  by mouth. Every other day, Disp: , Rfl:     Mometasone  "Furo-Formoterol Fum 100-5 MCG/ACT Aerosol, Inhale., Disp: , Rfl:     budesonide (PULMICORT) 0.5 MG/2ML Suspension, 500 mcg every day. Patient uses nebulizer solution in a mary jane pot like applicator with saline solution and performs a nasal irrigation twice a day, Disp: , Rfl:     montelukast (SINGULAIR) 10 MG Tab, Take 10 mg by mouth every day., Disp: , Rfl:     aspirin (ASA) 325 MG Tab, Take 325 mg by mouth 2 Times a Day., Disp: , Rfl:     Multiple Vitamin (MULTI VITAMIN DAILY PO), Take 1 tablet by mouth every day., Disp: , Rfl:      Social History     Tobacco Use    Smoking status: Never    Smokeless tobacco: Never   Vaping Use    Vaping status: Never Used   Substance Use Topics    Alcohol use: Yes     Comment: occ.    Drug use: No      Family History   Problem Relation Age of Onset    Heart Disease Father       Medications, Allergies, and current problem list reviewed today in Epic.      Objective     /74   Pulse 77   Temp 36.1 °C (97 °F) (Temporal)   Resp 16   Ht 1.702 m (5' 7\")   Wt 61.2 kg (135 lb)   LMP 01/15/2016   SpO2 99%   BMI 21.14 kg/m²      Physical Exam  Vitals reviewed.   Constitutional:       General: She is not in acute distress.  HENT:      Right Ear: Tympanic membrane, ear canal and external ear normal.      Left Ear: Tympanic membrane, ear canal and external ear normal.      Nose: Congestion present.      Right Sinus: No maxillary sinus tenderness or frontal sinus tenderness.      Left Sinus: No maxillary sinus tenderness or frontal sinus tenderness.      Mouth/Throat:      Mouth: Mucous membranes are moist.      Pharynx: Uvula midline. No oropharyngeal exudate, posterior oropharyngeal erythema or uvula swelling.   Eyes:      General: Vision grossly intact. Gaze aligned appropriately. No visual field deficit.     Extraocular Movements: Extraocular movements intact.      Conjunctiva/sclera: Conjunctivae normal.      Pupils: Pupils are equal, round, and reactive to light. "   Cardiovascular:      Rate and Rhythm: Normal rate and regular rhythm.      Pulses: Normal pulses.      Heart sounds: Normal heart sounds.   Pulmonary:      Effort: Pulmonary effort is normal. No tachypnea, accessory muscle usage, prolonged expiration, respiratory distress or retractions.      Breath sounds: Normal breath sounds. No stridor. No wheezing, rhonchi or rales.   Abdominal:      General: Abdomen is flat.      Palpations: Abdomen is soft.      Tenderness: There is no right CVA tenderness or left CVA tenderness.   Musculoskeletal:      Cervical back: Normal range of motion. No rigidity or tenderness. Normal range of motion.   Lymphadenopathy:      Cervical: No cervical adenopathy.   Skin:     General: Skin is warm and dry.   Neurological:      Mental Status: She is alert. Mental status is at baseline.   Psychiatric:         Mood and Affect: Mood normal.         Behavior: Behavior normal.         Thought Content: Thought content normal.       Results for orders placed or performed in visit on 06/30/24   POCT Urinalysis   Result Value Ref Range    POC Color light yellow Negative    POC Appearance clear Negative    POC Glucose negative Negative mg/dL    POC Bilirubin negative Negative mg/dL    POC Ketones negative Negative mg/dL    POC Specific Gravity 1.015 <1.005 - >1.030    POC Blood trace Negative    POC Urine PH 7.5 5.0 - 8.0    POC Protein negative Negative mg/dL    POC Urobiligen 0.2 Negative (0.2) mg/dL    POC Nitrites negative Negative    POC Leukocyte Esterase large Negative     Assessment & Plan     1. Dysuria   - POCT Urinalysis  - URINE CULTURE(NEW); Future    2. Acute cystitis without hematuria   - cefdinir (OMNICEF) 300 MG Cap; Take 1 Capsule by mouth 2 times a day for 5 days.  Dispense: 10 Capsule; Refill: 0    3. Nasal congestion  - Supportive measures encouraged     MDM/Comments:   Urinalysis and symptom presentation consistent with diagnosis of acute cystitis.   Patient will be prescribed  cefdinir based on age. Cockcroft-Gault method utilized to calculate CrCl, no renal dosing required.      Recommended supportive treatment at home for congestion:   OTC Tylenol for fever/discomfort.  OTC supportive care for nasal congestion - saline nasal spray/Flonase nasal spray and/or netipot  Humidifier and steam inhalation/warm showers.  Increase oral fluid intake.    Illness progression and alarm symptoms discussed with patient, emphasizing low threshold for returning to clinic/emergency department for worsening symptoms. Patient is agreeable to the plan and verbalizes understanding, and will follow up if warranted. Will return if body aches, chills, fever, back/flank pain occur. Discussed signs of kidney infection.       Patient advised to follow up with PCP and urology for recurrent, frequent urinary infections.      Discussed red flags and reasons to return to UC or ED.                            Electronically signed by BREANA Escobar

## 2024-07-01 DIAGNOSIS — R30.0 DYSURIA: ICD-10-CM

## 2024-07-01 LAB — AMBIGUOUS DTTM AMBI4: NORMAL

## 2024-07-04 LAB
BACTERIA UR CULT: ABNORMAL
BACTERIA UR CULT: ABNORMAL
SIGNIFICANT IND 70042: ABNORMAL
SITE SITE: ABNORMAL
SOURCE SOURCE: ABNORMAL

## 2024-07-05 ENCOUNTER — OFFICE VISIT (OUTPATIENT)
Dept: URGENT CARE | Facility: CLINIC | Age: 64
End: 2024-07-05
Payer: COMMERCIAL

## 2024-07-05 VITALS
RESPIRATION RATE: 16 BRPM | BODY MASS INDEX: 21.35 KG/M2 | HEIGHT: 67 IN | HEART RATE: 70 BPM | OXYGEN SATURATION: 100 % | SYSTOLIC BLOOD PRESSURE: 136 MMHG | TEMPERATURE: 97.5 F | WEIGHT: 136 LBS | DIASTOLIC BLOOD PRESSURE: 76 MMHG

## 2024-07-05 DIAGNOSIS — Z87.440 HISTORY OF UTI: ICD-10-CM

## 2024-07-05 PROCEDURE — 99213 OFFICE O/P EST LOW 20 MIN: CPT | Mod: 25 | Performed by: PHYSICIAN ASSISTANT

## 2024-07-05 PROCEDURE — 3075F SYST BP GE 130 - 139MM HG: CPT | Performed by: PHYSICIAN ASSISTANT

## 2024-07-05 PROCEDURE — 3078F DIAST BP <80 MM HG: CPT | Performed by: PHYSICIAN ASSISTANT

## 2024-09-06 ENCOUNTER — HOSPITAL ENCOUNTER (OUTPATIENT)
Facility: MEDICAL CENTER | Age: 64
End: 2024-09-06
Attending: FAMILY MEDICINE
Payer: COMMERCIAL

## 2024-09-06 ENCOUNTER — OFFICE VISIT (OUTPATIENT)
Dept: URGENT CARE | Facility: CLINIC | Age: 64
End: 2024-09-06
Payer: COMMERCIAL

## 2024-09-06 VITALS
OXYGEN SATURATION: 95 % | HEIGHT: 67 IN | WEIGHT: 134 LBS | HEART RATE: 76 BPM | TEMPERATURE: 97.3 F | BODY MASS INDEX: 21.03 KG/M2 | RESPIRATION RATE: 16 BRPM | SYSTOLIC BLOOD PRESSURE: 118 MMHG | DIASTOLIC BLOOD PRESSURE: 86 MMHG

## 2024-09-06 DIAGNOSIS — R35.0 URINARY FREQUENCY: Primary | ICD-10-CM

## 2024-09-06 DIAGNOSIS — R35.0 URINARY FREQUENCY: ICD-10-CM

## 2024-09-06 LAB
APPEARANCE UR: CLEAR
BILIRUB UR STRIP-MCNC: NEGATIVE MG/DL
COLOR UR AUTO: YELLOW
GLUCOSE UR STRIP.AUTO-MCNC: NEGATIVE MG/DL
KETONES UR STRIP.AUTO-MCNC: NEGATIVE MG/DL
LEUKOCYTE ESTERASE UR QL STRIP.AUTO: NEGATIVE
NITRITE UR QL STRIP.AUTO: NEGATIVE
PH UR STRIP.AUTO: 6 [PH] (ref 5–8)
PROT UR QL STRIP: NEGATIVE MG/DL
RBC UR QL AUTO: NEGATIVE
SP GR UR STRIP.AUTO: 1.01
UROBILINOGEN UR STRIP-MCNC: 0.2 MG/DL

## 2024-09-06 PROCEDURE — 3074F SYST BP LT 130 MM HG: CPT | Performed by: FAMILY MEDICINE

## 2024-09-06 PROCEDURE — 87086 URINE CULTURE/COLONY COUNT: CPT

## 2024-09-06 PROCEDURE — 99213 OFFICE O/P EST LOW 20 MIN: CPT | Performed by: FAMILY MEDICINE

## 2024-09-06 PROCEDURE — 3079F DIAST BP 80-89 MM HG: CPT | Performed by: FAMILY MEDICINE

## 2024-09-06 PROCEDURE — 81002 URINALYSIS NONAUTO W/O SCOPE: CPT | Performed by: FAMILY MEDICINE

## 2024-09-06 PROCEDURE — 87077 CULTURE AEROBIC IDENTIFY: CPT

## 2024-09-06 RX ORDER — NITROFURANTOIN 25; 75 MG/1; MG/1
100 CAPSULE ORAL 2 TIMES DAILY
Qty: 10 CAPSULE | Refills: 0 | Status: SHIPPED | OUTPATIENT
Start: 2024-09-06 | End: 2024-09-11

## 2024-09-06 NOTE — PROGRESS NOTES
Subjective     Kalie Lainey Cook is a 63 y.o. female who presents with Urinary Frequency (Not sure if it is an infection or not)    This is a  new problem with uncertain prognosis:   This is a very pleasant 63 y.o. who has come to the walk-in clinic today for couple years has urinary frequency sometimes every 2 hours worse at night.  No pain on urination.  Seems to be little worse in the past couple days so wanted to make sure she did not have a urinary tract infection before going out of town.  No nausea vomiting fevers chills          ALLERGIES:  Sulfa drugs     PMH:  Past Medical History:   Diagnosis Date    Asthma         PSH:  Past Surgical History:   Procedure Laterality Date    ABDOMINAL EXPLORATION      PRIMARY C SECTION      SINUSOTOMIES         MEDS:    Current Outpatient Medications:     nitrofurantoin (MACROBID) 100 MG Cap, Take 1 Capsule by mouth 2 times a day for 5 days., Disp: 10 Capsule, Rfl: 0    levothyroxine (SYNTHROID) 75 MCG Tab, TAKE 1 TABLET EVERY MORNINGON AN EMPTY STOMACH, Disp: 90 Tablet, Rfl: 1    budesonide-formoterol (SYMBICORT) 160-4.5 MCG/ACT Aerosol, , Disp: , Rfl:     mupirocin (BACTROBAN) 2 % Ointment, Apply 1 Application topically 2 times a day., Disp: , Rfl:     albuterol 108 (90 Base) MCG/ACT Aero Soln inhalation aerosol, , Disp: , Rfl:     Cholecalciferol (VITAMIN D3 PO), Take 1 Cap by mouth every day., Disp: , Rfl:     Nutritional Supplements (ESTROVEN PO), Take  by mouth. Every other day, Disp: , Rfl:     Mometasone Furo-Formoterol Fum 100-5 MCG/ACT Aerosol, Inhale., Disp: , Rfl:     budesonide (PULMICORT) 0.5 MG/2ML Suspension, 500 mcg every day. Patient uses nebulizer solution in a mary jane pot like applicator with saline solution and performs a nasal irrigation twice a day, Disp: , Rfl:     montelukast (SINGULAIR) 10 MG Tab, Take 10 mg by mouth every day., Disp: , Rfl:     aspirin (ASA) 325 MG Tab, Take 325 mg by mouth 2 Times a Day., Disp: , Rfl:     Multiple Vitamin (MULTI  "VITAMIN DAILY PO), Take 1 tablet by mouth every day., Disp: , Rfl:     ** I have documented what I find to be significant in regards to past medical, social, family and surgical history  in my HPI or under PMH/PSH/FH review section, otherwise it is noncontributory **           HPI    Review of Systems   Genitourinary:  Positive for dysuria.   All other systems reviewed and are negative.             Objective     /86 (BP Location: Left arm, Patient Position: Sitting, BP Cuff Size: Adult)   Pulse 76   Temp 36.3 °C (97.3 °F) (Temporal)   Resp 16   Ht 1.702 m (5' 7\")   Wt 60.8 kg (134 lb)   LMP 01/15/2016   SpO2 95%   BMI 20.99 kg/m²      Physical Exam  Vitals and nursing note reviewed.   Constitutional:       General: She is not in acute distress.     Appearance: Normal appearance. She is well-developed.   HENT:      Head: Normocephalic.   Cardiovascular:      Rate and Rhythm: Normal rate and regular rhythm.   Pulmonary:      Effort: Pulmonary effort is normal. No respiratory distress.   Abdominal:      Palpations: Abdomen is soft.      Tenderness: There is no abdominal tenderness.   Neurological:      Mental Status: She is alert.      Motor: No abnormal muscle tone.   Psychiatric:         Mood and Affect: Mood normal.         Behavior: Behavior normal.                             Assessment & Plan     1. Urinary frequency  POCT Urinalysis    URINE CULTURE(NEW)    nitrofurantoin (MACROBID) 100 MG Cap    Referral to Urology          - Dx, plan & d/c instructions discussed   - Rest, stay hydrated, may try some over-the-counter AZO for couple days    Follow up with your regular primary care providers office within a week to keep them updated and informed of this visit and for regular routine health maintenance check-ups. ER if not improving in 2-3 days or if feeling/getting worse. (If you do not have a primary care provider and need to schedule one you may call [facility] at [phone] to do this).    Patient " left in stable condition         POCT results reviewed/discussed    Discussed if any testing, labs or imaging studies are obtained outside of the [facility] facility, it is their responsibility to contact the Urgent Care and let us know that it was done and get us the results so adequate follow up can be initiated    Pertinent prior lab work and/or imaging studies in Epic have been reviewed by me today on day of this visit and taken into account for my treatment and plan today    Pertinent PMH/PSH and/or chronic conditions and medications if any were reviewed today and taken into account for my treatment and plan today    Pertinent prior office visit notes in [facility] have been reviewed by me today on day of this visit.    Please note that this dictation may have been created using voice recognition software, if so I have made every reasonable attempt to correct obvious errors, but I expect that there are errors of grammar and possibly content that I did not discover before finalizing the note.

## 2024-11-05 ENCOUNTER — OFFICE VISIT (OUTPATIENT)
Dept: UROLOGY | Facility: MEDICAL CENTER | Age: 64
End: 2024-11-05
Payer: COMMERCIAL

## 2024-11-05 DIAGNOSIS — N32.81 OAB (OVERACTIVE BLADDER): ICD-10-CM

## 2024-11-05 DIAGNOSIS — N39.41 URGE INCONTINENCE: ICD-10-CM

## 2024-11-05 LAB
APPEARANCE UR: CLEAR
BILIRUB UR STRIP-MCNC: NORMAL MG/DL
COLOR UR AUTO: YELLOW
GLUCOSE UR STRIP.AUTO-MCNC: NORMAL MG/DL
KETONES UR STRIP.AUTO-MCNC: NORMAL MG/DL
LEUKOCYTE ESTERASE UR QL STRIP.AUTO: NORMAL
NITRITE UR QL STRIP.AUTO: NORMAL
PH UR STRIP.AUTO: 6 [PH] (ref 5–8)
POC POST-VOID: 160 ML
POC PRE-VOID: NORMAL
PROT UR QL STRIP: NORMAL MG/DL
RBC UR QL AUTO: NORMAL
SP GR UR STRIP.AUTO: 1.01
UROBILINOGEN UR STRIP-MCNC: 0.2 MG/DL

## 2024-11-05 PROCEDURE — 99214 OFFICE O/P EST MOD 30 MIN: CPT

## 2024-11-05 PROCEDURE — 81002 URINALYSIS NONAUTO W/O SCOPE: CPT

## 2024-11-05 PROCEDURE — 51798 US URINE CAPACITY MEASURE: CPT

## 2024-11-05 NOTE — PATIENT INSTRUCTIONS
Start pelvic floor physical therapy twice weekly x 10 days.  Please continue doing his exercises at home.  Decrease fluid intake 4 hours before bedtime.  Goal decrease amount of times up at night to 1-2 times.  Double voiding-urinate and then urinate 30 minutes later regardless of if you have to go to the bathroom or not.  This should help with nocturia, and urgency/frequency.  You are not completely emptying your bladder in the clinic today so that should ideally make a difference for him and at times at night you get up to urinate.  4.  May need to consider referral to sleep medicine if no improvement and have a time to get up at night to urinate  5. At follow-up visit in 2 months May need to consider anticholinergic or beta 3 agonist

## 2024-11-05 NOTE — PROGRESS NOTES
Subjective  Kalie Lainey Cook is a 63 y.o. female who presents today for evaluation of urgency, frequency, and nocturia.  This is been progressively getting worse x 1 year.  Patient reports that she has urgency with leakage, and frequency, and nocturia getting up 4-5 times per night.    She does not have a history of recurrent urinary tract infections.  Last urinary tract infection culture was positive for strep anginosis and she was treated with antibiotics.  She does not typically get urinary tract infections.  She reported that she has only had 1 UTI this year.    Patient denies hematuria, dysuria, pain with wiping, dyspareunia, fevers, chills, flank pain, suprapubic pain-any urinary tract infection-like symptoms in clinic today.    She does have a longstanding history of allergies/asthma/sinusitis, and she is on many medications for this.  She denies sleep apnea, reporting that she does not snore or is unaware that she snores.    She reports that she drinks fluids up until she goes to bed, as she does have issues with orthostatic hypotension and needs to stay adequately hydrated.    She has not taken any medication for OAB/urgency with leakage and is not interested in taking medications if there are other modalities available.        Family History   Problem Relation Age of Onset    Heart Disease Father        Social History     Socioeconomic History    Marital status:      Spouse name: Not on file    Number of children: Not on file    Years of education: Not on file    Highest education level: Not on file   Occupational History    Not on file   Tobacco Use    Smoking status: Never    Smokeless tobacco: Never   Vaping Use    Vaping status: Never Used   Substance and Sexual Activity    Alcohol use: Yes     Comment: occ.    Drug use: No    Sexual activity: Yes     Partners: Male   Other Topics Concern     Service Not Asked    Blood Transfusions Not Asked    Caffeine Concern No     Occupational Exposure Not Asked    Hobby Hazards Not Asked    Sleep Concern Not Asked    Stress Concern Not Asked    Weight Concern No    Special Diet No    Back Care Not Asked    Exercise Yes    Bike Helmet Not Asked    Seat Belt Not Asked    Self-Exams Not Asked   Social History Narrative    Not on file     Social Drivers of Health     Financial Resource Strain: Not on file   Food Insecurity: Not on file   Transportation Needs: Not on file   Physical Activity: Not on file   Stress: Not on file   Social Connections: Not on file   Intimate Partner Violence: Not on file   Housing Stability: Not on file       Past Surgical History:   Procedure Laterality Date    ABDOMINAL EXPLORATION      PRIMARY C SECTION      SINUSOTOMIES         Past Medical History:   Diagnosis Date    Asthma        Current Outpatient Medications   Medication Sig Dispense Refill    levothyroxine (SYNTHROID) 75 MCG Tab TAKE 1 TABLET EVERY MORNINGON AN EMPTY STOMACH 90 Tablet 1    budesonide-formoterol (SYMBICORT) 160-4.5 MCG/ACT Aerosol       mupirocin (BACTROBAN) 2 % Ointment Apply 1 Application topically 2 times a day.      albuterol 108 (90 Base) MCG/ACT Aero Soln inhalation aerosol       Cholecalciferol (VITAMIN D3 PO) Take 1 Cap by mouth every day.      Nutritional Supplements (ESTROVEN PO) Take  by mouth. Every other day      Mometasone Furo-Formoterol Fum 100-5 MCG/ACT Aerosol Inhale.      budesonide (PULMICORT) 0.5 MG/2ML Suspension 500 mcg every day. Patient uses nebulizer solution in a mary jane pot like applicator with saline solution and performs a nasal irrigation twice a day      montelukast (SINGULAIR) 10 MG Tab Take 10 mg by mouth every day.      aspirin (ASA) 325 MG Tab Take 325 mg by mouth 2 Times a Day.      Multiple Vitamin (MULTI VITAMIN DAILY PO) Take 1 tablet by mouth every day.       No current facility-administered medications for this visit.       Allergies   Allergen Reactions    Sulfa Drugs Hives       Objective  There were  no vitals taken for this visit.  Physical Exam  Constitutional:       Appearance: Normal appearance.   HENT:      Head: Normocephalic and atraumatic.   Eyes:      Extraocular Movements: Extraocular movements intact.   Pulmonary:      Effort: Pulmonary effort is normal.   Genitourinary:     Comments:   Skin:     General: Skin is dry.   Neurological:      Mental Status: She is alert.   Psychiatric:         Mood and Affect: Mood normal.         Labs:   POCT UA   Lab Results   Component Value Date/Time    POCCOLOR yellow 11/05/2024 09:37 AM    POCAPPEAR clear 11/05/2024 09:37 AM    POCLEUKEST small 11/05/2024 09:37 AM    POCNITRITE neg 11/05/2024 09:37 AM    POCUROBILIGE 0.2 11/05/2024 09:37 AM    POCPROTEIN neg 11/05/2024 09:37 AM    POCURPH 6.0 11/05/2024 09:37 AM    POCBLOOD neg 11/05/2024 09:37 AM    POCSPGRV 1.010 11/05/2024 09:37 AM    POCKETONES neg 11/05/2024 09:37 AM    POCBILIRUBIN neg 11/05/2024 09:37 AM    POCGLUCUA neg 11/05/2024 09:37 AM          Imaging:   none    Assessment    For urgency with leakage/nocturia/urgency: We will attempt to send patient to pelvic floor physical therapy.  She has agreed that she would like to try pelvic floor physical therapy twice weekly x 10 visit to see if this will make a difference with her urgency and leakage along with her frequency.    For nocturia: We discussed decreasing fluid intake 4 hours before to bed.  We also discussed double voiding as her PVR in clinic today was 160.  We discussed decreasing drinks such as coffee, tea, and alcohol as those are all bladder irritants.    May consider sending her to pulmonary medicine if decreasing fluids ineffective.    Patient to return to clinic in 2 months once she completes pelvic floor physical therapy, and at that time we can reassess nocturia, and potential need to start anticholinergics, or beta 3 agonist    Plan    Problem List Items Addressed This Visit    None

## 2024-12-05 DIAGNOSIS — E03.9 HYPOTHYROIDISM, UNSPECIFIED TYPE: ICD-10-CM

## 2024-12-06 RX ORDER — LEVOTHYROXINE SODIUM 75 UG/1
TABLET ORAL
Qty: 90 TABLET | Refills: 1 | Status: SHIPPED | OUTPATIENT
Start: 2024-12-06

## 2024-12-09 ENCOUNTER — TELEPHONE (OUTPATIENT)
Dept: HEALTH INFORMATION MANAGEMENT | Facility: OTHER | Age: 64
End: 2024-12-09
Payer: COMMERCIAL

## 2024-12-13 ASSESSMENT — ACTIVITIES OF DAILY LIVING (ADL)
BATHING_REQUIRES_ASSISTANCE: 0

## 2024-12-13 ASSESSMENT — ENCOUNTER SYMPTOMS
GENERAL WELL-BEING: GOOD

## 2024-12-13 ASSESSMENT — PATIENT HEALTH QUESTIONNAIRE - PHQ9
2. FEELING DOWN, DEPRESSED, IRRITABLE, OR HOPELESS: NOT AT ALL
CLINICAL INTERPRETATION OF PHQ2 SCORE: 0
2. FEELING DOWN, DEPRESSED, IRRITABLE, OR HOPELESS: NOT AT ALL
2. FEELING DOWN, DEPRESSED, IRRITABLE, OR HOPELESS: NOT AT ALL
1. LITTLE INTEREST OR PLEASURE IN DOING THINGS: NOT AT ALL

## 2024-12-13 NOTE — ASSESSMENT & PLAN NOTE
Chronic, ongoing. Most recent lipid panel from 5/2024 with LDL at 133. Recommend Mediterranean diet and regular physical activity. Follow up with PCP at least annually for continued monitoring and management.   Lab Results   Component Value Date/Time    CHOLSTRLTOT 222 (H) 05/14/2024 06:20 AM     (H) 05/14/2024 06:20 AM    HDL 77 05/14/2024 06:20 AM    TRIGLYCERIDE 61 05/14/2024 06:20 AM

## 2024-12-13 NOTE — ASSESSMENT & PLAN NOTE
Chronic, stable. Pt maintains on levothyroxine 75mcg daily with TSH wnl as of 5/2024. Continue current treatment regime.Follow up with PCP at least annually for continued monitoring and management.   Latest Reference Range & Units 05/14/24 06:20   TSH 0.380 - 5.330 uIU/mL 3.050

## 2024-12-17 ENCOUNTER — APPOINTMENT (OUTPATIENT)
Dept: FAMILY PLANNING/WOMEN'S HEALTH CLINIC | Facility: PHYSICIAN GROUP | Age: 64
End: 2024-12-17
Payer: MEDICARE

## 2024-12-17 DIAGNOSIS — E78.5 DYSLIPIDEMIA: ICD-10-CM

## 2024-12-17 DIAGNOSIS — E03.9 HYPOTHYROIDISM (ACQUIRED): ICD-10-CM

## 2024-12-17 DIAGNOSIS — J45.30 MILD PERSISTENT ASTHMA WITHOUT COMPLICATION: ICD-10-CM

## 2024-12-17 NOTE — ASSESSMENT & PLAN NOTE
Chronic, stable. Pt maintains on Breyna 160-4.5mg daily,, montelukast 10mg daily, with albuterol PRN (rare). Denies dyspnea, wheezing, coughing. Follow up with allergy per routine for continued monitoring and management.

## 2024-12-17 NOTE — ASSESSMENT & PLAN NOTE
Chronic, ongoing. Today's PHQ 0/2. Pt maintains off pharmacotherapy after hx of escitalopram use. She continues to do well but notes the election has been a stressor for her as of late. Follow up with PCP at least annually for continued monitoring and management.

## 2024-12-18 ASSESSMENT — PATIENT HEALTH QUESTIONNAIRE - PHQ9: CLINICAL INTERPRETATION OF PHQ2 SCORE: 0

## 2024-12-19 ENCOUNTER — OFFICE VISIT (OUTPATIENT)
Dept: FAMILY PLANNING/WOMEN'S HEALTH CLINIC | Facility: PHYSICIAN GROUP | Age: 64
End: 2024-12-19
Payer: MEDICARE

## 2024-12-19 VITALS
DIASTOLIC BLOOD PRESSURE: 60 MMHG | BODY MASS INDEX: 21.5 KG/M2 | TEMPERATURE: 97.4 F | SYSTOLIC BLOOD PRESSURE: 118 MMHG | RESPIRATION RATE: 14 BRPM | HEIGHT: 67 IN | OXYGEN SATURATION: 98 % | HEART RATE: 63 BPM | WEIGHT: 137 LBS

## 2024-12-19 DIAGNOSIS — F32.5 MAJOR DEPRESSIVE DISORDER IN FULL REMISSION, UNSPECIFIED WHETHER RECURRENT (HCC): ICD-10-CM

## 2024-12-19 DIAGNOSIS — Z78.0 POSTMENOPAUSAL: ICD-10-CM

## 2024-12-19 DIAGNOSIS — J45.30 MILD PERSISTENT ASTHMA WITHOUT COMPLICATION: ICD-10-CM

## 2024-12-19 DIAGNOSIS — E03.9 HYPOTHYROIDISM (ACQUIRED): ICD-10-CM

## 2024-12-19 DIAGNOSIS — E78.5 DYSLIPIDEMIA: ICD-10-CM

## 2024-12-19 PROCEDURE — 3074F SYST BP LT 130 MM HG: CPT | Performed by: PHYSICIAN ASSISTANT

## 2024-12-19 PROCEDURE — G0402 INITIAL PREVENTIVE EXAM: HCPCS | Performed by: PHYSICIAN ASSISTANT

## 2024-12-19 PROCEDURE — 3078F DIAST BP <80 MM HG: CPT | Performed by: PHYSICIAN ASSISTANT

## 2024-12-19 RX ORDER — MUPIROCIN 20 MG/G
1 OINTMENT TOPICAL PRN
COMMUNITY

## 2024-12-19 SDOH — ECONOMIC STABILITY: FOOD INSECURITY: HOW HARD IS IT FOR YOU TO PAY FOR THE VERY BASICS LIKE FOOD, HOUSING, MEDICAL CARE, AND HEATING?: NOT HARD AT ALL

## 2024-12-19 SDOH — ECONOMIC STABILITY: TRANSPORTATION INSECURITY: IN THE PAST 12 MONTHS, HAS LACK OF TRANSPORTATION KEPT YOU FROM MEDICAL APPOINTMENTS OR FROM GETTING MEDICATIONS?: NO

## 2024-12-19 SDOH — ECONOMIC STABILITY: FOOD INSECURITY: WITHIN THE PAST 12 MONTHS, THE FOOD YOU BOUGHT JUST DIDN'T LAST AND YOU DIDN'T HAVE MONEY TO GET MORE.: NEVER TRUE

## 2024-12-19 SDOH — ECONOMIC STABILITY: FOOD INSECURITY: WITHIN THE PAST 12 MONTHS, YOU WORRIED THAT YOUR FOOD WOULD RUN OUT BEFORE YOU GOT THE MONEY TO BUY MORE.: NEVER TRUE

## 2024-12-19 ASSESSMENT — ACTIVITIES OF DAILY LIVING (ADL): LACK_OF_TRANSPORTATION: NO

## 2024-12-19 NOTE — PROGRESS NOTES
Comprehensive Health Assessment Program     Kalie Cook is a 64 y.o. here for her comprehensive health assessment.    Patient Active Problem List    Diagnosis Date Noted    Mass of breast 05/21/2024    Preventative health care 04/20/2023    Congestion of both ears 04/20/2023    Wheezing 04/20/2023    Dizziness 04/22/2022    Dyslipidemia 09/14/2021    Well woman exam with routine gynecological exam 07/21/2021    Urge incontinence 07/21/2021    Encounter for weight loss counseling 06/24/2020    Mild persistent asthma without complication 01/04/2018    Orthostatic hypotension 05/19/2016    Hypothyroidism (acquired) 04/25/2016    Major depressive disorder in full remission (HCC) 04/25/2016       Current Outpatient Medications   Medication Sig Dispense Refill    mupirocin (BACTROBAN) 2 % Ointment Apply 1 Application topically as needed.      levothyroxine (SYNTHROID) 75 MCG Tab TAKE 1 TABLET EVERY MORNINGON AN EMPTY STOMACH 90 Tablet 1    budesonide-formoterol (SYMBICORT) 160-4.5 MCG/ACT Aerosol       albuterol 108 (90 Base) MCG/ACT Aero Soln inhalation aerosol       Cholecalciferol (VITAMIN D3 PO) Take 1 Cap by mouth every day.      Nutritional Supplements (ESTROVEN PO) Take  by mouth. Every other day      budesonide (PULMICORT) 0.5 MG/2ML Suspension 500 mcg every day. Patient uses nebulizer solution in a mary jane pot like applicator with saline solution and performs a nasal irrigation twice a day      montelukast (SINGULAIR) 10 MG Tab Take 10 mg by mouth every day.      aspirin (ASA) 325 MG Tab Take 325 mg by mouth 2 Times a Day.      Multiple Vitamin (MULTI VITAMIN DAILY PO) Take 1 tablet by mouth every day.       No current facility-administered medications for this visit.          Current supplements as per medication list.     Allergies:   Sulfa drugs  Social History     Tobacco Use    Smoking status: Never    Smokeless tobacco: Never   Vaping Use    Vaping status: Never Used   Substance Use Topics     Alcohol use: Not Currently     Comment: socially    Drug use: No     Family History   Problem Relation Age of Onset    Heart Disease Father      Kalie  has a past medical history of Asthma.   Past Surgical History:   Procedure Laterality Date    ABDOMINAL EXPLORATION      PRIMARY C SECTION      SINUSOTOMIES         Screening:  In the last six months have you experienced any leakage of urine? Yes, pursuing pelvic floor PT    Depression Screening  Little interest or pleasure in doing things?  0 - not at all  Feeling down, depressed , or hopeless? 0 - not at all  Patient Health Questionnaire Score: 0     If depressive symptoms identified deferred to follow up visit unless specifically addressed in assessment and plan.    Interpretation of PHQ-9 Total Score   Score Severity   1-4 No Depression   5-9 Mild Depression   10-14 Moderate Depression   15-19 Moderately Severe Depression   20-27 Severe Depression    Screening for Cognitive Impairment  Do you or any of your friends or family members have any concern about your memory? No  Three Minute Recall (Leader, Season, Table) 3/3    Nic clock face with all 12 numbers and set the hands to show 10 minutes after 11.  Yes 5  Cognitive concerns identified deferred for follow up unless specifically addressed in assessment and plan.    Fall Risk Assessment  Has the patient had two or more falls in the last year or any fall with injury in the last year?  No    Safety Assessment  Do you always wear your seatbelt?  Yes  Any changes to home needed to function safely? No  Difficulty hearing.  No  Patient counseled about all safety risks that were identified.    Functional Assessment ADLs  Are there any barriers preventing you from cooking for yourself or meeting nutritional needs?  No.    Are there any barriers preventing you from driving safely or obtaining transportation?  No.    Are there any barriers preventing you from using a telephone or calling for help?  No    Are there any  barriers preventing you from shopping?  No.    Are there any barriers preventing you from taking care of your own finances?  No    Are there any barriers preventing you from managing your medications?  No    Are there any barriers preventing you from showering, bathing or dressing yourself? No    Are there any barriers preventing you from doing housework or laundry? No  Are there any barriers preventing you from using the toilet?No  Are you currently engaging in any exercise or physical activity?  Yes. Goes to the gym 5-6 times a week     Self-Assessment of Health  What is your perception of your health? Good    Do you sleep more than six hours a night? Yes    In the past 7 days, how much did pain keep you from doing your normal work? None    Do you spend quality time with family or friends (virtually or in person)? Yes    Do you usually eat a heart healthy diet that constists of a variety of fruits, vegetables, whole grains and fiber? Yes    Do you eat foods high in fat and/or Fast Food more than three times per week? No    How concerned are you that your medical conditions are not being well managed? a little    Are you worried that in the next 2 months, you may not have stable housing that you own, rent, or stay in as part of a household? No      Advance Care Planning  Do you have an Advance Directive, Living Will, Durable Power of , or POLST? Yes  Advance Directive       is on file      Health Maintenance Summary            Ordered - Bone Density Scan (Every 5 Years) Ordered on 12/19/2024      No completion history exists for this topic.              Overdue - HIV Screening (Once) Never done      No completion history exists for this topic.              Overdue - Cervical Cancer Screening (Every 3 Years) Overdue since 7/21/2024 07/21/2021  THINPREP PAP WITH HPV    07/21/2021  Pathology Gynecology Specimen    03/08/2018  THINPREP PAP WITH HPV    03/08/2018  PATHOLOGY GYN SPECIMEN    07/18/2016   THINPREP PAP WITH HPV    Only the first 5 history entries have been loaded, but more history exists.              Mammogram (Every 2 Years) Next due on 4/5/2026 04/05/2024  MA-DIAGNOSTIC MAMMO BILAT W/TOMOSYNTHESIS W/CAD    06/13/2023  MA-SCREENING MAMMO BILAT W/TOMOSYNTHESIS W/CAD    09/03/2021  MA-SCREENING MAMMO BILAT W/TOMOSYNTHESIS W/CAD    11/14/2019  MA-SCREENING MAMMO BILAT W/TOMOSYNTHESIS W/CAD    10/29/2018  MA-MAMMO SCREENING BILAT W/GLORY W/CAD    Only the first 5 history entries have been loaded, but more history exists.              IMM DTaP/Tdap/Td Vaccine (3 - Td or Tdap) Next due on 11/16/2029 11/16/2019  Imm Admin: Tdap Vaccine    05/02/2011  Imm Admin: Tdap Vaccine    09/08/2005  Imm Admin: TD Vaccine              Colorectal Cancer Screening (Colonoscopy - Every 10 Years) Tentatively due on 8/20/2031 08/20/2021  REFERRAL TO GI FOR COLONOSCOPY              Hepatitis A Vaccine (Hep A) (Series Information) Aged Out      03/29/2005  Imm Admin: Hep A/HEP B Combined Vaccine (TwinRix)    09/22/2004  Imm Admin: Hep A/HEP B Combined Vaccine (TwinRix)    08/20/2004  Imm Admin: Hep A/HEP B Combined Vaccine (TwinRix)              Hepatitis B Vaccine (Hep B) (Series Information) Completed      03/29/2005  Imm Admin: Hep A/HEP B Combined Vaccine (TwinRix)    09/22/2004  Imm Admin: Hep A/HEP B Combined Vaccine (TwinRix)    08/20/2004  Imm Admin: Hep A/HEP B Combined Vaccine (TwinRix)              Zoster (Shingles) Vaccines (Series Information) Completed      10/14/2019  Imm Admin: Zoster Vaccine Recombinant (RZV) (SHINGRIX)    06/12/2019  Imm Admin: Zoster Vaccine Recombinant (RZV) (SHINGRIX)              Hepatitis C Screening  Tentatively Complete      07/06/2020  Hepatitis C Antibody component of HEP C VIRUS ANTIBODY              Pneumococcal Vaccine: 65+ Years (Series Information) Completed      02/27/2024  Imm Admin: Pneumococcal Conjugate Vaccine (PCV20)    03/23/2016  Imm Admin: Pneumococcal  polysaccharide vaccine (PPSV-23)    09/13/2014  Imm Admin: Pneumococcal polysaccharide vaccine (PPSV-23)              Influenza Vaccine (Series Information) Completed      09/03/2024  Outside Immunization: Influenza Inj MDCK P    09/17/2023  Imm Admin: Influenza Vac Subunit Quad Inj (Pf)    09/22/2022  Imm Admin: Influenza Vaccine Quad Inj (Pf)    09/14/2021  Imm Admin: Influenza Vaccine Quad Inj (Pf)    09/11/2020  Imm Admin: Influenza Vaccine Quad Inj (Pf)    Only the first 5 history entries have been loaded, but more history exists.              COVID-19 Vaccine (Series Information) Completed      09/06/2024  Outside Immunization: COVID-19(PFR) 12yrs \T\ up    09/17/2023  Imm Admin: COVID-19, mRNA, LNP-S, PF, sulaiman-sucrose, 30 mcg/0.3 mL    10/05/2022  Imm Admin: MODERNA BIVALENT BOOSTER SARS-COV-2 VACCINE (6+)    05/28/2022  Outside Immunization: COVID-19 mRNA (MOD)    10/29/2021  Outside Immunization: COVID-19 mRNA (MOD)    Only the first 5 history entries have been loaded, but more history exists.              HPV Vaccines (Series Information) Aged Out      No completion history exists for this topic.              Polio Vaccine (Inactivated Polio) (Series Information) Aged Out      No completion history exists for this topic.              Meningococcal Immunization (Series Information) Aged Out      No completion history exists for this topic.                    Patient Care Team:  Madeline Anderson M.D. as PCP - General (Family Medicine)  Vineet Alejandre M.D. (Inactive) (Cardiovascular Disease (Cardiology))      Financial Resource Strain: Low Risk  (12/19/2024)    Overall Financial Resource Strain (CARDIA)     Difficulty of Paying Living Expenses: Not hard at all      Transportation Needs: No Transportation Needs (12/19/2024)    PRAPARE - Transportation     Lack of Transportation (Medical): No     Lack of Transportation (Non-Medical): No      Food Insecurity: No Food Insecurity (12/19/2024)    Hunger Vital  "Sign     Worried About Running Out of Food in the Last Year: Never true     Ran Out of Food in the Last Year: Never true        Encounter Vitals  Temperature: 36.3 °C (97.4 °F)  Temp src: Temporal  Blood Pressure : 118/60  Pulse: 63  Respiration: 14  Pulse Oximetry: 98 %  Weight: 62.1 kg (137 lb)  Height: 170.2 cm (5' 7\")  BMI (Calculated): 21.46     Physical Exam:  Constitutional: NAD  HENMT: NC/AT, EOMI, no lymphadenopathy, no thyromegaly.  Cardiovascular: RRR, No m/r/g  Lungs: CTAB, no w/r/r  Extremities: No c/c/e  Skin: No lesions notes  Neurologic: Alert & oriented x3, CN II-XII grossly intact    Assessment and Plan. The following treatment and monitoring plan is recommended:  Dyslipidemia  Chronic, ongoing. Most recent lipid panel from 5/2024 with LDL at 133. Recommend Mediterranean diet and regular physical activity. Follow up with PCP at least annually for continued monitoring and management.         Lab Results   Component Value Date/Time     CHOLSTRLTOT 222 (H) 05/14/2024 06:20 AM      (H) 05/14/2024 06:20 AM     HDL 77 05/14/2024 06:20 AM     TRIGLYCERIDE 61 05/14/2024 06:20 AM       Mild persistent asthma without complication  Chronic, stable. Pt maintains on Breyna 160-4.5mg daily,, montelukast 10mg daily, with albuterol PRN (rare). Denies dyspnea, wheezing, coughing. Follow up with allergy per routine for continued monitoring and management.     Hypothyroidism (acquired)  Chronic, stable. Pt maintains on levothyroxine 75mcg daily with TSH wnl as of 5/2024. Continue current treatment regime.Follow up with PCP at least annually for continued monitoring and management.    Latest Reference Range & Units 05/14/24 06:20   TSH 0.380 - 5.330 uIU/mL 3.050        Major depressive disorder in full remission (HCC)  Chronic, ongoing. Today's PHQ 0/2. Pt maintains off pharmacotherapy after hx of escitalopram use. She continues to do well but notes the election has been a stressor for her as of late. Follow up " with PCP at least annually for continued monitoring and management.    Services suggested: No services needed at this time  Health Care Screening: Age-appropriate preventive services recommended by USPTF and ACIP covered by Medicare were discussed today. Services ordered if indicated and agreed upon by the patient.  Referrals offered: Community-based lifestyle interventions to reduce health risks and promote self-management and wellness, fall prevention, nutrition, physical activity, tobacco-use cessation, weight loss, and mental health services as per orders if indicated.    Discussion today about general wellness and lifestyle habits:    Prevent falls and reduce trip hazards; Cautioned about securing or removing rugs.  Have a working fire alarm and carbon monoxide detector.  Engage in regular physical activity and social activities.    Follow-up: No follow-ups on file.

## 2025-01-09 ENCOUNTER — OFFICE VISIT (OUTPATIENT)
Dept: UROLOGY | Facility: MEDICAL CENTER | Age: 65
End: 2025-01-09
Payer: MEDICARE

## 2025-01-09 DIAGNOSIS — N32.81 OAB (OVERACTIVE BLADDER): Primary | ICD-10-CM

## 2025-01-09 DIAGNOSIS — N39.41 URGE INCONTINENCE: ICD-10-CM

## 2025-01-09 PROCEDURE — 99213 OFFICE O/P EST LOW 20 MIN: CPT | Performed by: PHYSICIAN ASSISTANT

## 2025-01-09 NOTE — PATIENT INSTRUCTIONS
Kegel Exercises     Try to close or tighten your rectal opening as is you were stopping diarrhea or gas from passing   Hold for a count of 5   Relax for a count of 10   Repeat these steps for 15 times in a row twice per day (Morning and evening)

## 2025-01-09 NOTE — PROGRESS NOTES
Reason for visit:   OAB    HPI   64 y.o. female presenting for review of OAB symptoms. The patient was last seen in the office on 11/5/24 by BREANA Weiss who recommended PFPT and nocturia guidelines.     The patient does feel that she is overall doing much better. She participated in PFPT and has been diligent about performing home exercises. She reports that she is baseline very active and spends at least an hour at the gym daily.     She reports that leaking has completely resolved. She still reports overactivity especially after 3 am while sleeping. She does adhere to nocturia guidelines.    The patient has not had concern for recurrent UTI symptoms. She denies any dysuria and gross hematuria. Overall she is feeling well.    11/5/24 OV with BREANA Weiss:  Subjective  Kalielos Cook is a 63 y.o. female who presents today for evaluation of urgency, frequency, and nocturia.  This is been progressively getting worse x 1 year.  Patient reports that she has urgency with leakage, and frequency, and nocturia getting up 4-5 times per night.     She does not have a history of recurrent urinary tract infections.  Last urinary tract infection culture was positive for strep anginosis and she was treated with antibiotics.  She does not typically get urinary tract infections.  She reported that she has only had 1 UTI this year.     Patient denies hematuria, dysuria, pain with wiping, dyspareunia, fevers, chills, flank pain, suprapubic pain-any urinary tract infection-like symptoms in clinic today.     She does have a longstanding history of allergies/asthma/sinusitis, and she is on many medications for this.  She denies sleep apnea, reporting that she does not snore or is unaware that she snores.     She reports that she drinks fluids up until she goes to bed, as she does have issues with orthostatic hypotension and needs to stay adequately hydrated.     She has not taken any medication for OAB/urgency with  leakage and is not interested in taking medications if there are other modalities available.    Urologic PMH:    OAB     There were no vitals filed for this visit.      Physical Exam:   General:  Alert & Oriented, NAD     Assessment and plan:     OAB     -- We reviewed behavioral options for management of OAB symptoms including: timed voiding, good day time fluid management, nocturia guidelines and elimination of all bladder irritants in diet -- specifically caffeine and alcohol   -- We also discussed the option to trial a medication for OAB symptoms, specifically discussing Trospium (anticholinergic) and Myrbetriq (Beta agonist) and highlighting that there is a black box warning for the development of dementia in most anticholinergics, but Trospium is safe   -- We reviewed that all OAB medications have side effects that include: dry mouth, dry eyes, constipation and urinary retention -- she would still like to avoid medications for treatment  -- She will continue home PFPT -- home exercises were provided to the patient   -- Good daily water intake of at least 48-64 ounces  -- Follow-up in 6 months or as needed    I personally reviewed the patient's pertinent medical records and labs/images available to me.     My total time spent caring for the patient on the day of the encounter was 25 minutes.   This does not include time spent on separately billable procedures/tests.    Uzair Saravia PA-C

## 2025-01-10 ENCOUNTER — TELEPHONE (OUTPATIENT)
Dept: MEDICAL GROUP | Facility: MEDICAL CENTER | Age: 65
End: 2025-01-10
Payer: MEDICARE

## 2025-01-10 NOTE — TELEPHONE ENCOUNTER
Patient came in to have her insurance card downloaded into her chart... Insurance is uploaded. Thank you.

## 2025-01-26 ENCOUNTER — PATIENT MESSAGE (OUTPATIENT)
Dept: MEDICAL GROUP | Facility: MEDICAL CENTER | Age: 65
End: 2025-01-26
Payer: MEDICARE

## 2025-01-26 DIAGNOSIS — E03.9 HYPOTHYROIDISM, UNSPECIFIED TYPE: ICD-10-CM

## 2025-01-27 RX ORDER — LEVOTHYROXINE SODIUM 75 UG/1
TABLET ORAL
Qty: 100 TABLET | Refills: 2 | Status: SHIPPED | OUTPATIENT
Start: 2025-01-27

## 2025-02-21 ENCOUNTER — HOSPITAL ENCOUNTER (OUTPATIENT)
Dept: RADIOLOGY | Facility: MEDICAL CENTER | Age: 65
End: 2025-02-21
Attending: PHYSICIAN ASSISTANT
Payer: MEDICARE

## 2025-02-21 DIAGNOSIS — Z78.0 POSTMENOPAUSAL: ICD-10-CM

## 2025-02-21 PROCEDURE — 77080 DXA BONE DENSITY AXIAL: CPT

## 2025-02-25 ENCOUNTER — RESULTS FOLLOW-UP (OUTPATIENT)
Dept: FAMILY PLANNING/WOMEN'S HEALTH CLINIC | Facility: PHYSICIAN GROUP | Age: 65
End: 2025-02-25

## 2025-02-25 PROBLEM — M81.0 AGE-RELATED OSTEOPOROSIS WITHOUT CURRENT PATHOLOGICAL FRACTURE: Status: ACTIVE | Noted: 2025-02-25

## 2025-03-24 ENCOUNTER — PATIENT MESSAGE (OUTPATIENT)
Dept: HEALTH INFORMATION MANAGEMENT | Facility: OTHER | Age: 65
End: 2025-03-24

## 2025-04-19 PROCEDURE — RXMED WILLOW AMBULATORY MEDICATION CHARGE: Performed by: ALLERGY & IMMUNOLOGY

## 2025-04-21 ENCOUNTER — PHARMACY VISIT (OUTPATIENT)
Dept: PHARMACY | Facility: MEDICAL CENTER | Age: 65
End: 2025-04-21
Payer: COMMERCIAL

## 2025-06-17 ENCOUNTER — APPOINTMENT (OUTPATIENT)
Dept: MEDICAL GROUP | Facility: MEDICAL CENTER | Age: 65
End: 2025-06-17
Payer: COMMERCIAL

## 2025-06-17 VITALS
HEART RATE: 64 BPM | DIASTOLIC BLOOD PRESSURE: 64 MMHG | RESPIRATION RATE: 18 BRPM | WEIGHT: 135 LBS | HEIGHT: 67 IN | SYSTOLIC BLOOD PRESSURE: 104 MMHG | TEMPERATURE: 97.7 F | BODY MASS INDEX: 21.19 KG/M2 | OXYGEN SATURATION: 97 %

## 2025-06-17 DIAGNOSIS — M62.89 PELVIC FLOOR DYSFUNCTION IN FEMALE: Primary | ICD-10-CM

## 2025-06-17 DIAGNOSIS — E03.9 HYPOTHYROIDISM, UNSPECIFIED TYPE: ICD-10-CM

## 2025-06-17 DIAGNOSIS — Z12.31 ENCOUNTER FOR SCREENING MAMMOGRAM FOR BREAST CANCER: ICD-10-CM

## 2025-06-17 DIAGNOSIS — E78.5 DYSLIPIDEMIA: ICD-10-CM

## 2025-06-17 PROCEDURE — 99214 OFFICE O/P EST MOD 30 MIN: CPT | Performed by: FAMILY MEDICINE

## 2025-06-17 PROCEDURE — 3078F DIAST BP <80 MM HG: CPT | Performed by: FAMILY MEDICINE

## 2025-06-17 PROCEDURE — 3074F SYST BP LT 130 MM HG: CPT | Performed by: FAMILY MEDICINE

## 2025-06-17 SDOH — ECONOMIC STABILITY: INCOME INSECURITY: IN THE LAST 12 MONTHS, WAS THERE A TIME WHEN YOU WERE NOT ABLE TO PAY THE MORTGAGE OR RENT ON TIME?: NO

## 2025-06-17 SDOH — ECONOMIC STABILITY: TRANSPORTATION INSECURITY
IN THE PAST 12 MONTHS, HAS THE LACK OF TRANSPORTATION KEPT YOU FROM MEDICAL APPOINTMENTS OR FROM GETTING MEDICATIONS?: NO

## 2025-06-17 SDOH — ECONOMIC STABILITY: INCOME INSECURITY: HOW HARD IS IT FOR YOU TO PAY FOR THE VERY BASICS LIKE FOOD, HOUSING, MEDICAL CARE, AND HEATING?: NOT HARD AT ALL

## 2025-06-17 SDOH — HEALTH STABILITY: PHYSICAL HEALTH: ON AVERAGE, HOW MANY DAYS PER WEEK DO YOU ENGAGE IN MODERATE TO STRENUOUS EXERCISE (LIKE A BRISK WALK)?: 5 DAYS

## 2025-06-17 SDOH — ECONOMIC STABILITY: FOOD INSECURITY: WITHIN THE PAST 12 MONTHS, YOU WORRIED THAT YOUR FOOD WOULD RUN OUT BEFORE YOU GOT MONEY TO BUY MORE.: NEVER TRUE

## 2025-06-17 SDOH — ECONOMIC STABILITY: FOOD INSECURITY: WITHIN THE PAST 12 MONTHS, THE FOOD YOU BOUGHT JUST DIDN'T LAST AND YOU DIDN'T HAVE MONEY TO GET MORE.: NEVER TRUE

## 2025-06-17 SDOH — HEALTH STABILITY: PHYSICAL HEALTH: ON AVERAGE, HOW MANY MINUTES DO YOU ENGAGE IN EXERCISE AT THIS LEVEL?: 50 MIN

## 2025-06-17 SDOH — ECONOMIC STABILITY: TRANSPORTATION INSECURITY
IN THE PAST 12 MONTHS, HAS LACK OF TRANSPORTATION KEPT YOU FROM MEETINGS, WORK, OR FROM GETTING THINGS NEEDED FOR DAILY LIVING?: NO

## 2025-06-17 SDOH — HEALTH STABILITY: MENTAL HEALTH
STRESS IS WHEN SOMEONE FEELS TENSE, NERVOUS, ANXIOUS, OR CAN'T SLEEP AT NIGHT BECAUSE THEIR MIND IS TROUBLED. HOW STRESSED ARE YOU?: TO SOME EXTENT

## 2025-06-17 SDOH — ECONOMIC STABILITY: HOUSING INSECURITY
IN THE LAST 12 MONTHS, WAS THERE A TIME WHEN YOU DID NOT HAVE A STEADY PLACE TO SLEEP OR SLEPT IN A SHELTER (INCLUDING NOW)?: NO

## 2025-06-17 SDOH — ECONOMIC STABILITY: TRANSPORTATION INSECURITY
IN THE PAST 12 MONTHS, HAS LACK OF RELIABLE TRANSPORTATION KEPT YOU FROM MEDICAL APPOINTMENTS, MEETINGS, WORK OR FROM GETTING THINGS NEEDED FOR DAILY LIVING?: NO

## 2025-06-17 ASSESSMENT — SOCIAL DETERMINANTS OF HEALTH (SDOH)
HOW OFTEN DO YOU ATTEND CHURCH OR RELIGIOUS SERVICES?: MORE THAN 4 TIMES PER YEAR
DO YOU BELONG TO ANY CLUBS OR ORGANIZATIONS SUCH AS CHURCH GROUPS UNIONS, FRATERNAL OR ATHLETIC GROUPS, OR SCHOOL GROUPS?: YES
HOW OFTEN DO YOU GET TOGETHER WITH FRIENDS OR RELATIVES?: MORE THAN THREE TIMES A WEEK
HOW OFTEN DO YOU ATTENT MEETINGS OF THE CLUB OR ORGANIZATION YOU BELONG TO?: MORE THAN 4 TIMES PER YEAR
HOW OFTEN DO YOU ATTEND CHURCH OR RELIGIOUS SERVICES?: MORE THAN 4 TIMES PER YEAR
HOW OFTEN DO YOU GET TOGETHER WITH FRIENDS OR RELATIVES?: MORE THAN THREE TIMES A WEEK
IN THE PAST 12 MONTHS, HAS THE ELECTRIC, GAS, OIL, OR WATER COMPANY THREATENED TO SHUT OFF SERVICE IN YOUR HOME?: NO
IN A TYPICAL WEEK, HOW MANY TIMES DO YOU TALK ON THE PHONE WITH FAMILY, FRIENDS, OR NEIGHBORS?: MORE THAN THREE TIMES A WEEK
HOW HARD IS IT FOR YOU TO PAY FOR THE VERY BASICS LIKE FOOD, HOUSING, MEDICAL CARE, AND HEATING?: NOT HARD AT ALL
DO YOU BELONG TO ANY CLUBS OR ORGANIZATIONS SUCH AS CHURCH GROUPS UNIONS, FRATERNAL OR ATHLETIC GROUPS, OR SCHOOL GROUPS?: YES
HOW OFTEN DO YOU ATTENT MEETINGS OF THE CLUB OR ORGANIZATION YOU BELONG TO?: MORE THAN 4 TIMES PER YEAR
WITHIN THE PAST 12 MONTHS, YOU WORRIED THAT YOUR FOOD WOULD RUN OUT BEFORE YOU GOT THE MONEY TO BUY MORE: NEVER TRUE
IN A TYPICAL WEEK, HOW MANY TIMES DO YOU TALK ON THE PHONE WITH FAMILY, FRIENDS, OR NEIGHBORS?: MORE THAN THREE TIMES A WEEK

## 2025-06-17 ASSESSMENT — PATIENT HEALTH QUESTIONNAIRE - PHQ9
7. TROUBLE CONCENTRATING ON THINGS, SUCH AS READING THE NEWSPAPER OR WATCHING TELEVISION: NOT AT ALL
8. MOVING OR SPEAKING SO SLOWLY THAT OTHER PEOPLE COULD HAVE NOTICED. OR THE OPPOSITE, BEING SO FIGETY OR RESTLESS THAT YOU HAVE BEEN MOVING AROUND A LOT MORE THAN USUAL: NOT AT ALL
2. FEELING DOWN, DEPRESSED, IRRITABLE, OR HOPELESS: NOT AT ALL
9. THOUGHTS THAT YOU WOULD BE BETTER OFF DEAD, OR OF HURTING YOURSELF: NOT AT ALL
5. POOR APPETITE OR OVEREATING: NOT AT ALL
3. TROUBLE FALLING OR STAYING ASLEEP OR SLEEPING TOO MUCH: NOT AT ALL
SUM OF ALL RESPONSES TO PHQ QUESTIONS 1-9: 0
6. FEELING BAD ABOUT YOURSELF - OR THAT YOU ARE A FAILURE OR HAVE LET YOURSELF OR YOUR FAMILY DOWN: NOT AL ALL
4. FEELING TIRED OR HAVING LITTLE ENERGY: NOT AT ALL
SUM OF ALL RESPONSES TO PHQ9 QUESTIONS 1 AND 2: 0
1. LITTLE INTEREST OR PLEASURE IN DOING THINGS: NOT AT ALL

## 2025-06-17 NOTE — PROGRESS NOTES
This medical record contains text that has been entered with the assistance of computer voice recognition and dictation software.  Therefore, it may contain unintended errors in text, spelling, punctuation, or grammar        Chief Complaint   Patient presents with    Annual Exam    Asthma     Pt has used her asthma meds more frequent for the past mo. Pt feels chest congestion with a bit of productive cough.   Pt will also see her allergy MD who is a asthma specialist but would like her lungs checked.  Pt tests her peak flow and it's usually 350 but it's been dropping below 300. After taking her inhalers her peak goes up to 350.    Bladder Problem     Pt had urine frequency with a bit of incontinence but after pt saw Urologist who recommended Kegel exercises pt's problem has diminished.       Requesting Labs     General labs       Kalie Cook is a 64 y.o. female here evaluation and management of:       History of Present Illness  The patient presents for evaluation of overactive bladder and mild asthma.    She has been under the care of a urologist, currently on maternity leave. Advised to consult a physical therapist, she performs prescribed exercises twice daily, which have been beneficial. She incorporates stretching exercises at the gym.    She has a long-standing history of mild asthma since 18 months old. Currently under Dr. Blank's office, she has not met her new physician due to Dr. Saba's alf but has an upcoming appointment. Reports decreased peak flow managed by using her rescue inhaler before budesonide inhaler, improving peak flow 20 minutes post-inhalation. Also taking montelukast. Underwent aspirin desensitization, allergies well-controlled. Had 5 sinus surgeries for significant polyps, sensitive to aspirin. Desensitized by Dr. Blank, now takes a whole aspirin morning and night lifelong. No recurrence of polyps. If aspirin missed for a few days, needs desensitization again. Believes  "aspirin desensitization eliminated polyp issue connected to asthma. Reports coughing up small amount of yellow sputum, no infection, describes chest heaviness.    PAST SURGICAL HISTORY:  5 sinus surgeries.        Current Medications[1]  Patient Active Problem List    Diagnosis Date Noted    Age-related osteoporosis without current pathological fracture 02/25/2025    Mass of breast 05/21/2024    Preventative health care 04/20/2023    Congestion of both ears 04/20/2023    Wheezing 04/20/2023    Dizziness 04/22/2022    Dyslipidemia 09/14/2021    Well woman exam with routine gynecological exam 07/21/2021    Urge incontinence 07/21/2021    Encounter for weight loss counseling 06/24/2020    Mild persistent asthma without complication 01/04/2018    Orthostatic hypotension 05/19/2016    Hypothyroidism (acquired) 04/25/2016    Major depressive disorder in full remission (HCC) 04/25/2016     Past Surgical History[2]   Social History[3]  Family History   Problem Relation Age of Onset    Heart Disease Father     Dementia Father            ROS    all review of system completed and negative except for those listed above     Objective:     /64 (BP Location: Left arm, Patient Position: Sitting, BP Cuff Size: Adult)   Pulse 64   Temp 36.5 °C (97.7 °F) (Temporal)   Resp 18   Ht 1.702 m (5' 7\")   Wt 61.2 kg (135 lb)   SpO2 97%  Body mass index is 21.14 kg/m².  Physical Exam:        GEN: comfortable, alert and oriented, well nourished, well developed, in no apparent distress   HEENT: NCAT, eyes: pupils equal and reactive, sclera white, EOMIT, good dentition  HEART: limbs warm and well perfused, regular rate, no JVD, no lower extremity edema  LUNGS: speaking in full sentences, not in apparent respiratory distress, no audible wheezes  MSK: normal tone and bulk, no swelling of the joints, gait steady and normal       Assessment and Plan:   The following treatment plan was discussed        Assessment & Plan  Pelvic floor " dysfunction in female    Orders:    Referral to Urogynecology    Encounter for screening mammogram for breast cancer    Orders:    MA-SCREENING MAMMO BILAT W/TOMOSYNTHESIS W/CAD; Future    Dyslipidemia    Orders:    Lipid Profile; Future    Basic Metabolic Panel; Future    Hypothyroidism, unspecified type    Orders:    TSH; Future        Assessment & Plan  Overactive bladder  Continues pelvic floor exercises as advised by physical therapist.    Diagnostic plan: Referral to Dr. Harsh Villagomez or partner, pelvic floor surgeon, for further evaluation and treatment.    Treatment plan: Discussed behavioral management: timed voiding, good daytime fluid management, elimination of irritants like caffeine and alcohol. Suggested [employer] for additional exercises and resources.    Mild asthma  Taking montelukast, uses rescue inhaler before budesonide inhaler when peak flow drops. Reports coughing up yellow sputum, no infection. Chest clear on examination.    Treatment plan: Continues asthma management plan, has upcoming appointment with new provider at [facility]'s office.    Follow-up: Upcoming appointment with new provider at [facility]'s office.          Instructed to follow up if symptoms worsen or fail to improve, ER/UC precautions discussed as well    Madeline Anderson MD  [facility], Family Medicine   [address] [place], [place] [address]y   [place] [address]  Phone: [phone]                    [1]   Current Outpatient Medications   Medication Sig Dispense Refill    fluticasone-salmeterol (ADVAIR) 250-50 MCG/ACT AEROSOL POWDER, BREATH ACTIVATED Inhale 1 puff by mouth twice daily. 60 Each 5    budesonide (PULMICORT) 0.5 MG/2ML Suspension Inhale 1 vial via nebulizer daily. 180 mL 0    albuterol 108 (90 Base) MCG/ACT Aero Soln inhalation aerosol Inhale 2 puffs by mouth every 4 hours as needed 18 g 1    levothyroxine (SYNTHROID) 75 MCG Tab TAKE 1 TABLET EVERY MORNINGON AN EMPTY STOMACH 100 Tablet 2    mupirocin (BACTROBAN) 2 % Ointment Apply 1  Application topically as needed.      budesonide-formoterol (SYMBICORT) 160-4.5 MCG/ACT Aerosol       albuterol 108 (90 Base) MCG/ACT Aero Soln inhalation aerosol       Cholecalciferol (VITAMIN D3 PO) Take 1 Cap by mouth every day.      Nutritional Supplements (ESTROVEN PO) Take  by mouth. Every other day      budesonide (PULMICORT) 0.5 MG/2ML Suspension 500 mcg every day. Patient uses nebulizer solution in a mary jane pot like applicator with saline solution and performs a nasal irrigation twice a day      montelukast (SINGULAIR) 10 MG Tab Take 10 mg by mouth every day.      aspirin (ASA) 325 MG Tab Take 325 mg by mouth 2 Times a Day.      Multiple Vitamin (MULTI VITAMIN DAILY PO) Take 1 tablet by mouth every day.       No current facility-administered medications for this visit.   [2]   Past Surgical History:  Procedure Laterality Date    ABDOMINAL EXPLORATION      PRIMARY C SECTION      SINUSOTOMIES     [3]   Social History  Tobacco Use    Smoking status: Never    Smokeless tobacco: Never   Vaping Use    Vaping status: Never Used   Substance Use Topics    Alcohol use: Yes     Alcohol/week: 1.2 oz     Types: 2 Cans of beer per week     Comment: socially    Drug use: No

## 2025-06-23 NOTE — Clinical Note
REFERRAL APPROVAL NOTICE         Sent on June 23, 2025                   Kalie Cook  [address]  [place] [address]                   Dear Ms. Cook,    After a careful review of the medical information and benefit coverage, [facility] has processed your referral. See below for additional details.    If applicable, you must be actively enrolled with your insurance for coverage of the authorized service. If you have any questions regarding your coverage, please contact your insurance directly.    REFERRAL INFORMATION   Referral #:  [id number]  Referred-To Department    Referred-By Provider:  Urogynecology    Madeline Anderson M.D.   [facility]      [address]  [address]  [place] [address]  [phone] [address] [facility] [address], [address]  [place] [id number] [phone]    Referral Start Date:  06/17/2025  Referral End Date:   06/17/2026             SCHEDULING  If you do not already have an appointment, please call [phone] to make an appointment.     MORE INFORMATION  If you do not already have a [employer] account, sign up at: [email or web address]  You can access your medical information, make appointments, see lab results, billing information, and more.  If you have questions regarding this referral, please contact  the [facility] Referrals department at:             [phone]. Monday - Friday 8:00AM - 5:00PM.     Sincerely,    [facility]

## 2025-06-26 PROCEDURE — RXMED WILLOW AMBULATORY MEDICATION CHARGE: Performed by: NURSE PRACTITIONER

## 2025-06-27 ENCOUNTER — PHARMACY VISIT (OUTPATIENT)
Dept: PHARMACY | Facility: MEDICAL CENTER | Age: 65
End: 2025-06-27
Payer: COMMERCIAL

## 2025-06-27 ENCOUNTER — HOSPITAL ENCOUNTER (OUTPATIENT)
Dept: LAB | Facility: MEDICAL CENTER | Age: 65
End: 2025-06-27
Attending: FAMILY MEDICINE
Payer: MEDICARE

## 2025-06-27 DIAGNOSIS — E78.5 DYSLIPIDEMIA: ICD-10-CM

## 2025-06-27 DIAGNOSIS — E03.9 HYPOTHYROIDISM, UNSPECIFIED TYPE: ICD-10-CM

## 2025-06-27 LAB
ANION GAP SERPL CALC-SCNC: 11 MMOL/L (ref 7–16)
BUN SERPL-MCNC: 14 MG/DL (ref 8–22)
CALCIUM SERPL-MCNC: 9.9 MG/DL (ref 8.5–10.5)
CHLORIDE SERPL-SCNC: 102 MMOL/L (ref 96–112)
CHOLEST SERPL-MCNC: 234 MG/DL (ref 100–199)
CO2 SERPL-SCNC: 26 MMOL/L (ref 20–33)
CREAT SERPL-MCNC: 0.94 MG/DL (ref 0.5–1.4)
FASTING STATUS PATIENT QL REPORTED: NORMAL
GFR SERPLBLD CREATININE-BSD FMLA CKD-EPI: 67 ML/MIN/1.73 M 2
GLUCOSE SERPL-MCNC: 82 MG/DL (ref 65–99)
HDLC SERPL-MCNC: 85 MG/DL
LDLC SERPL CALC-MCNC: 137 MG/DL
POTASSIUM SERPL-SCNC: 5.1 MMOL/L (ref 3.6–5.5)
SODIUM SERPL-SCNC: 139 MMOL/L (ref 135–145)
TRIGL SERPL-MCNC: 58 MG/DL (ref 0–149)
TSH SERPL-ACNC: 2.7 UIU/ML (ref 0.38–5.33)

## 2025-06-27 PROCEDURE — 80061 LIPID PANEL: CPT

## 2025-06-27 PROCEDURE — 84443 ASSAY THYROID STIM HORMONE: CPT

## 2025-06-27 PROCEDURE — 36415 COLL VENOUS BLD VENIPUNCTURE: CPT

## 2025-06-27 PROCEDURE — 80048 BASIC METABOLIC PNL TOTAL CA: CPT

## 2025-07-01 ENCOUNTER — TELEPHONE (OUTPATIENT)
Dept: MEDICAL GROUP | Facility: MEDICAL CENTER | Age: 65
End: 2025-07-01
Payer: MEDICARE

## 2025-07-17 ENCOUNTER — OFFICE VISIT (OUTPATIENT)
Dept: MEDICAL GROUP | Facility: MEDICAL CENTER | Age: 65
End: 2025-07-17
Payer: MEDICARE

## 2025-07-17 VITALS
WEIGHT: 135 LBS | RESPIRATION RATE: 16 BRPM | TEMPERATURE: 97.3 F | BODY MASS INDEX: 21.19 KG/M2 | HEART RATE: 69 BPM | DIASTOLIC BLOOD PRESSURE: 64 MMHG | OXYGEN SATURATION: 97 % | HEIGHT: 67 IN | SYSTOLIC BLOOD PRESSURE: 102 MMHG

## 2025-07-17 DIAGNOSIS — Z11.3 SCREEN FOR STD (SEXUALLY TRANSMITTED DISEASE): ICD-10-CM

## 2025-07-17 DIAGNOSIS — Z13.1 SCREENING FOR DIABETES MELLITUS (DM): Primary | ICD-10-CM

## 2025-07-17 DIAGNOSIS — E03.9 HYPOTHYROIDISM, UNSPECIFIED TYPE: ICD-10-CM

## 2025-07-17 DIAGNOSIS — Z13.6 SCREENING FOR ISCHEMIC HEART DISEASE: ICD-10-CM

## 2025-07-17 PROCEDURE — 3078F DIAST BP <80 MM HG: CPT | Performed by: FAMILY MEDICINE

## 2025-07-17 PROCEDURE — 3074F SYST BP LT 130 MM HG: CPT | Performed by: FAMILY MEDICINE

## 2025-07-17 PROCEDURE — 99214 OFFICE O/P EST MOD 30 MIN: CPT | Performed by: FAMILY MEDICINE

## 2025-07-17 NOTE — PROGRESS NOTES
This medical record contains text that has been entered with the assistance of computer voice recognition and dictation software.  Therefore, it may contain unintended errors in text, spelling, punctuation, or grammar        Chief Complaint   Patient presents with    Results     Lab results.    Dizziness     Pt's had dizziness off/on x 1 year.  Pt notices when she gets up after been laying down.  HX: low BP readings when laying down.         Kalie Cook is a 64 y.o. female here evaluation and management of:       History of Present Illness  The patient presents for evaluation of dizziness and elevated cholesterol.    She experiences recurrent dizziness, particularly when rising from a lying position, without room spinning or fainting. Morning blood pressure drops to 40, previously monitored by her cardiologist. She maintains an active lifestyle with daily gym workouts and cardiovascular exercises six days a week, ensures hydration with no-sugar Gatorade mixed with water, and occasionally consumes sea salt. Daily caffeine intake is one cup of coffee in the morning, no soda. History of asthma since 18 months old.    Several years ago, she was under cardiologist care for dizziness, underwent extensive testing including a tilt table test for POTS, results inconclusive. Sinus condition improved after five surgeries for polyps, possibly resolving dizziness.    Recent blood work shows slight increase in cholesterol. Currently taking aspirin for sinus condition.    PAST SURGICAL HISTORY:  - Five surgeries for sinus polyps        Current Medications[1]  Patient Active Problem List    Diagnosis Date Noted    Age-related osteoporosis without current pathological fracture 02/25/2025    Mass of breast 05/21/2024    Preventative health care 04/20/2023    Congestion of both ears 04/20/2023    Wheezing 04/20/2023    Dizziness 04/22/2022    Dyslipidemia 09/14/2021    Well woman exam with routine gynecological exam 07/21/2021  "   Urge incontinence 07/21/2021    Encounter for weight loss counseling 06/24/2020    Mild persistent asthma without complication 01/04/2018    Orthostatic hypotension 05/19/2016    Hypothyroidism (acquired) 04/25/2016    Major depressive disorder in full remission (HCC) 04/25/2016     Past Surgical History[2]   Social History[3]  Family History   Problem Relation Age of Onset    Heart Disease Father     Dementia Father            ROS    all review of system completed and negative except for those listed above     Objective:     /64 (BP Location: Left arm, Patient Position: Standing, BP Cuff Size: Adult)   Pulse 69   Temp 36.3 °C (97.3 °F) (Temporal)   Resp 16   Ht 1.702 m (5' 7\")   Wt 61.2 kg (135 lb)   SpO2 97%  Body mass index is 21.14 kg/m².  Physical Exam:        GEN: comfortable, alert and oriented, well nourished, well developed, in no apparent distress   HEENT: NCAT, eyes: pupils equal and reactive, sclera white, EOMIT, good dentition  HEART: limbs warm and well perfused, regular rate, no JVD, no lower extremity edema  LUNGS: speaking in full sentences, not in apparent respiratory distress, no audible wheezes  MSK: normal tone and bulk, no swelling of the joints, gait steady and normal       Assessment and Plan:   The following treatment plan was discussed        Assessment & Plan  Screening for diabetes mellitus (DM)    Orders:    Lipid Profile; Future    Comp Metabolic Panel; Future    TSH WITH REFLEX TO FT4; Future    Screening for ischemic heart disease    Orders:    Lipid Profile; Future    Comp Metabolic Panel; Future    TSH WITH REFLEX TO FT4; Future    Hypothyroidism, unspecified type    Orders:    Lipid Profile; Future    Comp Metabolic Panel; Future    TSH WITH REFLEX TO FT4; Future    Screen for STD (sexually transmitted disease)    Orders:    HIV AG/AB COMBO ASSAY SCREENING; Future    HEP C VIRUS ANTIBODY; Future        Assessment & Plan  1. Dizziness.  Symptoms suggest orthostatic " dizziness, not POTS.    Treatment plan: Regular cardiovascular exercise recommended to enhance sympathetic response; advised consistent sleep schedule, hydration, and electrolytes.    Lifestyle modification: Advised orthostatic precautions; referral to cardiology if symptoms persist or worsen.    Clinical decision making: Low blood pressure readings beneficial for reducing stroke and heart attack risk.    2. Elevated cholesterol.  LDL cholesterol 137, improved from 164 in 2022 and 147.    Treatment plan: Statin therapy discussed; not beneficial at this time.    Lifestyle modification: Annual cholesterol monitoring; statin therapy reconsidered if LDL remains in 150s or 160s.    Clinical decision making: Low blood pressure and daily aspirin intake reduce stroke and heart attack risk.    Follow-up: Annual cholesterol monitoring.          Instructed to follow up if symptoms worsen or fail to improve, ER/UC precautions discussed as well    Madeline Anderson MD  [facility], Family [address]   [place] [address]  Phone: [phone]                    [1]   Current Outpatient Medications   Medication Sig Dispense Refill    fluticasone-salmeterol (ADVAIR) 250-50 MCG/ACT AEROSOL POWDER, BREATH ACTIVATED Inhale 1 puff by mouth twice daily. 60 Each 5    budesonide (PULMICORT) 0.5 MG/2ML Suspension Inhale 1 vial via nebulizer daily. 180 mL 0    albuterol 108 (90 Base) MCG/ACT Aero Soln inhalation aerosol Inhale 2 puffs by mouth every 4 hours as needed 18 g 1    levothyroxine (SYNTHROID) 75 MCG Tab TAKE 1 TABLET EVERY MORNINGON AN EMPTY STOMACH 100 Tablet 2    mupirocin (BACTROBAN) 2 % Ointment Apply 1 Application topically as needed.      budesonide-formoterol (SYMBICORT) 160-4.5 MCG/ACT Aerosol       albuterol 108 (90 Base) MCG/ACT Aero Soln inhalation aerosol       Cholecalciferol (VITAMIN D3 PO) Take 1 Cap by mouth every day.      Nutritional Supplements (ESTROVEN PO) Take  by mouth. Every other day       budesonide (PULMICORT) 0.5 MG/2ML Suspension 500 mcg every day. Patient uses nebulizer solution in a mary jane pot like applicator with saline solution and performs a nasal irrigation twice a day      montelukast (SINGULAIR) 10 MG Tab Take 10 mg by mouth every day.      aspirin (ASA) 325 MG Tab Take 325 mg by mouth 2 Times a Day.      Multiple Vitamin (MULTI VITAMIN DAILY PO) Take 1 tablet by mouth every day.       No current facility-administered medications for this visit.   [2]   Past Surgical History:  Procedure Laterality Date    ABDOMINAL EXPLORATION      PRIMARY C SECTION      SINUSOTOMIES     [3]   Social History  Tobacco Use    Smoking status: Never    Smokeless tobacco: Never   Vaping Use    Vaping status: Never Used   Substance Use Topics    Alcohol use: Yes     Alcohol/week: 1.2 oz     Types: 2 Cans of beer per week     Comment: socially    Drug use: No

## 2025-07-29 ENCOUNTER — GYNECOLOGY VISIT (OUTPATIENT)
Dept: GYNECOLOGY | Facility: CLINIC | Age: 65
End: 2025-07-29
Payer: MEDICARE

## 2025-07-29 VITALS
HEIGHT: 67 IN | WEIGHT: 136.8 LBS | DIASTOLIC BLOOD PRESSURE: 78 MMHG | BODY MASS INDEX: 21.47 KG/M2 | SYSTOLIC BLOOD PRESSURE: 123 MMHG | HEART RATE: 63 BPM

## 2025-07-29 DIAGNOSIS — N32.81 OVERACTIVE BLADDER: Primary | ICD-10-CM

## 2025-07-29 DIAGNOSIS — N95.8 GENITOURINARY SYNDROME OF MENOPAUSE: ICD-10-CM

## 2025-07-29 RX ORDER — ESTRADIOL 0.1 MG/G
CREAM VAGINAL
Qty: 1 EACH | Refills: 6 | Status: SHIPPED | OUTPATIENT
Start: 2025-07-29

## 2025-07-29 NOTE — PATIENT INSTRUCTIONS
COCONUT OIL as a LUBRICANT    Review of Systems   Constitutional: Negative for appetite change, chills and fever. Eyes: Negative for pain, redness and visual disturbance. Respiratory: Negative for cough, shortness of breath and wheezing. Cardiovascular: Negative for chest pain and leg swelling. Gastrointestinal: Negative for abdominal pain, nausea and vomiting. Genitourinary: Negative for difficulty urinating, discharge, dysuria, flank pain, frequency, hematuria, scrotal swelling, testicular pain and urgency. Musculoskeletal: Negative for back pain, joint swelling and myalgias. Skin: Negative for color change, rash and wound. Neurological: Negative for dizziness, tremors and numbness. Hematological: Negative for adenopathy. Does not bruise/bleed easily. distress. Neuro: Alert and oriented to person, place and time. Psych: Mood normal, affect normal  Skin: No rash noted  HEENT: Head: Normocephalic andatraumatic  Conjunctivae and EOM are normal. Pupils are equal, round  Nose:Normal  Right External Ear: Normal; Left External Ear: Normal  Mouth: Mucosa Moist  Neck: Supple  Lungs: Respiratory effort is normal  Cardiovascular: Warm & Pink  Abdomen: Soft, non-tender, non-distended with no CVA,  No flank tenderness,  Or hepatosplenomegaly   Lymphatics: No palpablelymphadenopathy. Bladder non-tender and not distended. Musculoskeletal: Normal gait and station  Rectal deferred due to ongoing colon issues per patient request      Assessment and Plan      1. Screening for malignant neoplasm of prostate           Plan:   F/u 1 year psa/BOB      Return in about 1 year (around 10/19/2021) for psa. Prescriptions Ordered:  No orders of the defined types were placed in this encounter. Orders Placed:  Orders Placed This Encounter   Procedures    PSA, Diagnostic     Standing Status:   Future     Standing Expiration Date:   10/19/2021           Nate Barrera MD    Agree with the ROS entered by the MA.

## 2025-07-29 NOTE — PROGRESS NOTES
Urogynecology & Reconstructive Pelvic Surgery - Consultation Visit    Kalie Cook MRN:[id number] :1960    Referred by: Dr. Madeline Anderson    Reason for Visit:   Chief Complaint   Patient presents with    Other     New Pt. Ref for  Pelvic floor dysfunction in female         Subjective     History of Presenting Illness:  Kalie Cook is a 64 y.o. P2 asthma, hypothyroid, h/o depression who presents for the evaluation and management of urinary frequency.     Reports frequent urination and urgency, very rarely will leak on the way to the bathroom but this does not happen regularly. This improved with Kegels/PFPT in the past. She has seen Urology [facility].     Has dizziness with position changes and if she drinks electrolyte rich water this helps.     Does not snore and has not been told she snores. Stops fluids 3-4h prior to bed. Does not drink throughout the night.     Prior Pelvic surgery:   CSx1     Prior treatment:   Kegels  PFPT - helpful, improved symptoms     Fluid intake:   Water: 48-64oz with gatorade for dizziness   Coffee: 8oz   Tea: -  Soda: -  Juice: 4oz     Pelvic floor symptom review:     Bladder:   Voids per day: 4-6 Voids per night: 3-4  with urge     Urinary incontinence episodes per day: 0    Urge leakage:  None   Stress leakage: None   Continuous / insensible urine loss: No    Nocturnal enuresis: No    Leakage volume: n/a   Number of pads/day: m/a    Bladder emptying: Complete   Voiding symptoms: Weak Stream   UTI in last 12 months: 2   Other urologic history: none       Prolapse:     Prolapse symptoms: None   Degree of prolapse: n/a   Exacerbating factors:n/a   Relieving factors: n/a   Duration of prolapse symptoms: n/a      Bowel:    Constipation: No    Bowel movements per day: 1 , stool quality: smooth soft logs    Straining to empty bowels: No    Splinting to evacuate: No    Painful evacuation: No    Difficulty emptying rectum: No    Incontinence to stool: No    Blood in stool: No     Hemorrhoids: No    Bowel conditions: n/a   Most recent colonoscopy:        Sexual function:    Sexually active: Yes   Gender of partners: Male   Pain with intercourse: Discomfort due to dryness: Yes, uses vasoline    History of abuse: No        Pelvic Pain: None      Past medical and surgical history    Past obstetric history   Number of vaginal deliveries: 1   Number of  deliveries: 1   History of vacuum/forceps: No    History of obstetric anal sphincter injury: Yes    Past gynecological history:    Last menstrual period/Menopause: Patient's last menstrual period was 01/15/2016.   History of abnormal uterine bleeding: No    History of fibroids: No    History of endometrial polyps:  No    History of endometriosis: No    History of cervical dysplasia: No    Last pap: 2021 NILM/HPV neg    Current contraception: none      Past medical history:  Past Medical History:   Diagnosis Date    Asthma      Past surgical history:  Past Surgical History:   Procedure Laterality Date    PRIMARY C SECTION  1991    ABDOMINAL EXPLORATION      SINUSOTOMIES      Removal of polyps  x5 times     Medications:has a current medication list which includes the following prescription(s): levothyroxine, mupirocin, budesonide-formoterol, albuterol, cholecalciferol, nutritional supplements, budesonide, montelukast, aspirin, multiple vitamin, fluticasone-salmeterol, budesonide, and albuterol.  Allergies:Sulfa drugs  Family history:  Family History   Problem Relation Age of Onset    No Known Problems Mother     Heart Disease Father     Dementia Father      Social history: reports that she has never smoked. She has never used smokeless tobacco. She reports current alcohol use of about 1.2 oz of alcohol per week. She reports that she does not use drugs.    Review of systems: A full review of systems was performed, and negative with the exception of want is noted above in the HPI.        Objective        /78 (BP Location:  "Left arm, Patient Position: Sitting, BP Cuff Size: Adult)   Pulse 63   Ht 5' 7\"   Wt 136 lb 12.8 oz   LMP 01/15/2016   BMI 21.43 kg/m²     Physical Exam  Constitutional:       Appearance: Normal appearance. She is normal weight.   HENT:      Head: Normocephalic.      Nose: Nose normal.   Eyes:      Pupils: Pupils are equal, round, and reactive to light.   Cardiovascular:      Comments: Normotensive  Pulmonary:      Effort: Pulmonary effort is normal.   Abdominal:      Palpations: Abdomen is soft.   Musculoskeletal:         General: Normal range of motion.      Cervical back: Normal range of motion.   Skin:     General: Skin is warm.   Neurological:      General: No focal deficit present.      Mental Status: She is alert.   Psychiatric:         Mood and Affect: Mood normal.          Genitourinary:    Vulva: WNL   Perineal sensation: WNL   Bulbocavernosus reflex: Intact   Anal wink reflex: Intact   Urethra: Atrophic   Vagina: Atrophic   Atrophy: Mild   Cough stress test: Negative    Chaperone was present throughout the physical exam.     Pelvic floor:    POP-Q:   Aa -3 Ba -3 C -7   GH 2 PB 3 TVL8.5   Ap -3 Bp -3 D -8.5      Prolapse stage: 0   Cervical elongation: No    Urethral tenderness: No    Bladder/ suprapubic tenderness: No    Levator tenderness: None   Levator muscle tone: WNL   Pelvic floor contraction strength (modified Oxford scale): 2=Weak   Pelvic floor contraction duration: Normal   Bimanual exam: Small, Mobile Uterus   Rectal: deferred   Vaginal band/stricture: No     Procedure Performed: No    Diagnostic test and records review:    Urine dipstick: void prior to appt   Lab Results   Component Value Date/Time    POCCOLOR yellow 11/05/2024 09:37 AM    POCAPPEAR clear 11/05/2024 09:37 AM    POCLEUKEST small 11/05/2024 09:37 AM    POCNITRITE neg 11/05/2024 09:37 AM    POCUROBILIGE 0.2 11/05/2024 09:37 AM    POCPROTEIN neg 11/05/2024 09:37 AM    POCURPH 6.0 11/05/2024 09:37 AM    POCBLOOD neg 11/05/2024 " 09:37 AM    POCSPGRV 1.010 11/05/2024 09:37 AM    POCKETONES neg 11/05/2024 09:37 AM    POCBILIRUBIN neg 11/05/2024 09:37 AM    POCGLUCUA neg 11/05/2024 09:37 AM         Post-void residual: 67 mL, performed by Bladder Scanner    Labs:   Glycohemoglobin   Date Value Ref Range Status   04/25/2022 5.1 4.0 - 5.6 % Final     Comment:     Increased risk for diabetes:  5.7 -6.4%  Diabetes:  >6.4%  Glycemic control for adults with diabetes:  <7.0%    The above interpretations are per ADA guidelines.  Diagnosis  of diabetes mellitus on the basis of elevated Hemoglobin A1c  should be confirmed by repeating the Hb A1c test.         Lab Results   Component Value Date/Time    SODIUM 139 06/27/2025 0634    POTASSIUM 5.1 06/27/2025 0634    CHLORIDE 102 06/27/2025 0634    CO2 26 06/27/2025 0634    GLUCOSE 82 06/27/2025 0634    BUN 14 06/27/2025 0634    CREATININE 0.94 06/27/2025 0634    CALCIUM 9.9 06/27/2025 0634    ANION 11.0 06/27/2025 0634       Lab Results   Component Value Date/Time    WBC 5.7 04/25/2022 06:32 AM    RBC 4.93 04/25/2022 06:32 AM    HEMOGLOBIN 15.4 04/25/2022 06:32 AM    MCV 94.5 04/25/2022 06:32 AM    MCH 31.2 04/25/2022 06:32 AM    MCHC 33.0 (L) 04/25/2022 06:32 AM    RDW 44.6 04/25/2022 06:32 AM    MPV 10.1 04/25/2022 06:32 AM         Radiology: None    Procedural: None    Documentation reviewed: Prior EMR Records    Outside records reviewed: 0 pages      Assessment & Plan     Kalie Cook is a 64 y.o. P2 with OAB, GSM. We discussed my recommendations for further diagnosis and treatment at length today.     Assessment & Plan  Overactive bladder  This patient has overactive bladder; She was educated on the pathophysiology of bladder urgency, and that her symptoms are likely due to overactivity of the bladder muscle and nerves. Conservative/behavioral management strategies were reviewed, including fluid management, avoidance of bladder irritants, urge strategies and Kegel's exercises. Moderate weight loss  in obese patients (5-8%) can lead to significant urinary symptom improvement. Overview of pelvic floor physical therapy, biofeedback, and second-line oral medical therapy (anticholinergics, beta-3 agonists) and third-line therapies (intravesical botox injection, PTNS, sacral neuromodulation) discussed.   - She will continue PFME and bladder training. Written material provided.   - FU in 3 months on symptoms        Genitourinary syndrome of menopause  She has vaginal atrophy / genitorurinary syndrome of menopause. This is very common and due to low estrogen levels, which render the vaginal tissue thin, irritated, and open to colonization with gut benson. This can lead to irritation, dryness, painful sex, urinary infections and urinary urgency and incontinence. Discussed risks, benefits, and indications for vaginal estrogen therapy.  Vaginal estrogen is considered a topical-only therapy that has negligible absorption into the bloodstream and is not associated with increased risks for uterine or breast cancers, stroke, blood clots. The effects of vaginal estrogen can take weeks to months.    - FU in 3 months on symptoms   - Prescription given for:   Orders:    estradiol (ESTRACE VAGINAL) 0.1 MG/GM vaginal cream; Apply 1g cream inside vagina using applicator nightly for 2 weeks, then twice per week thereafter. For refills, continue to take twice per week.      Medical decision making (MDM)  45 minutes total time spent on the date of the encounter:    5 min reviewing records  15 min with the history and physical exam   20 min  spent in direct patient education and counseling   0 min spent in the coordination of care  5 min spent in electronic medical record documentation in the patient's chart.    Roma Adrian MD  Urogynecology and Reconstructive Pelvic Surgery  Department of Obstetrics and Gynecology  [facility]

## 2025-07-29 NOTE — PROGRESS NOTES
PT here today for consult   New pt Ref for Pelvic floor dysfunction in female  UTI Sx: No   Hysterectomy: No   Good #: [phone]  PVR : 67  mL   Pharmacy Verified    Pt unable to void

## 2025-07-29 NOTE — Clinical Note
Hi Dr. Anderson,  Thank you  for referring Mrs. Cook for OAB. Attached is my note for your review. Let me know if you have questions or concerns.  Take care,  Roma Adrian MD Female Pelvic Medicine and Reconstructive Surgery Department of Obstetrics and Gynecology [facility] of Medicine [facility]

## 2025-08-05 ENCOUNTER — HOSPITAL ENCOUNTER (OUTPATIENT)
Dept: RADIOLOGY | Facility: MEDICAL CENTER | Age: 65
End: 2025-08-05
Attending: FAMILY MEDICINE
Payer: MEDICARE

## 2025-08-05 VITALS — BODY MASS INDEX: 21.19 KG/M2 | HEIGHT: 67 IN | WEIGHT: 135 LBS

## 2025-08-05 DIAGNOSIS — Z12.31 ENCOUNTER FOR SCREENING MAMMOGRAM FOR BREAST CANCER: ICD-10-CM

## 2025-08-05 PROCEDURE — 77067 SCR MAMMO BI INCL CAD: CPT
